# Patient Record
Sex: FEMALE | Race: WHITE | NOT HISPANIC OR LATINO | Employment: FULL TIME | URBAN - NONMETROPOLITAN AREA
[De-identification: names, ages, dates, MRNs, and addresses within clinical notes are randomized per-mention and may not be internally consistent; named-entity substitution may affect disease eponyms.]

---

## 2017-02-14 RX ORDER — NORGESTIMATE AND ETHINYL ESTRADIOL 7DAYSX3 28
1 KIT ORAL DAILY
Qty: 28 TABLET | Refills: 0 | Status: SHIPPED | OUTPATIENT
Start: 2017-02-14 | End: 2017-02-16 | Stop reason: SDUPTHER

## 2017-02-16 RX ORDER — NORGESTIMATE AND ETHINYL ESTRADIOL 7DAYSX3 28
1 KIT ORAL DAILY
Qty: 28 TABLET | Refills: 0 | Status: SHIPPED | OUTPATIENT
Start: 2017-02-16 | End: 2017-05-09

## 2017-03-02 ENCOUNTER — OFFICE VISIT (OUTPATIENT)
Dept: OBSTETRICS AND GYNECOLOGY | Facility: CLINIC | Age: 27
End: 2017-03-02

## 2017-03-02 VITALS
DIASTOLIC BLOOD PRESSURE: 80 MMHG | SYSTOLIC BLOOD PRESSURE: 140 MMHG | WEIGHT: 230 LBS | HEIGHT: 70 IN | BODY MASS INDEX: 32.93 KG/M2

## 2017-03-02 DIAGNOSIS — Z30.41 ENCOUNTER FOR SURVEILLANCE OF CONTRACEPTIVE PILLS: ICD-10-CM

## 2017-03-02 DIAGNOSIS — Z01.419 VISIT FOR GYNECOLOGIC EXAMINATION: Primary | ICD-10-CM

## 2017-03-02 DIAGNOSIS — B08.1 MOLLUSCUM CONTAGIOSUM: ICD-10-CM

## 2017-03-02 PROCEDURE — 99395 PREV VISIT EST AGE 18-39: CPT | Performed by: NURSE PRACTITIONER

## 2017-03-02 PROCEDURE — G0123 SCREEN CERV/VAG THIN LAYER: HCPCS | Performed by: NURSE PRACTITIONER

## 2017-03-02 RX ORDER — NORGESTIMATE AND ETHINYL ESTRADIOL 7DAYSX3 28
1 KIT ORAL DAILY
Qty: 28 TABLET | Refills: 12 | Status: SHIPPED | OUTPATIENT
Start: 2017-03-02 | End: 2017-03-02

## 2017-03-02 RX ORDER — NORGESTIMATE AND ETHINYL ESTRADIOL 7DAYSX3 28
1 KIT ORAL DAILY
Qty: 28 TABLET | Refills: 12 | Status: SHIPPED | OUTPATIENT
Start: 2017-03-02 | End: 2017-05-30

## 2017-03-02 NOTE — PATIENT INSTRUCTIONS
Molluscum Contagiosum, Adult  Molluscum contagiosum is a skin infection that can cause a rash. When molluscum contagiosum affects the genital area, it is called a sexually transmitted disease (STD).  CAUSES  Molluscum contagiosum is caused by a virus. The virus can spread easily from person to person through:  · Skin-to-skin contact with an infected person.  · Contact with an infected object, such as a towel or clothing.  RISK FACTORS  You may be at higher risk for molluscum contagiosum if you:  · Live in an area where the weather is moist and warm.  · Have a weak body defense system (immune system).  SIGNS AND SYMPTOMS  The main symptom is a rash that appears 2-7 weeks after exposure to the virus. It is made up of 2-20 small, firm, dome-shaped bumps that may:  · Be pink or flesh-colored.  · Appear alone or in groups.  · Range from the size of a pinhead to the size of a pencil eraser.  · Feel smooth and waxy.  · Have a pit in the middle.  · Itch. The rash does not itch for most people.  The bumps often appears on the genitals, thighs, face, neck, and abdomen.  DIAGNOSIS   A health care provider can usually diagnose molluscum contagiosum by looking at the bumps on your skin. To confirm the diagnosis, your health care provider may scrape the bumps to collect a skin sample to examine under a microscope.  TREATMENT  The bumps may go away on their own, but people often have treatment to keep the virus from infecting someone else or to keep the rash from spreading to other body parts. Treatment may include:  · Surgery to remove the bumps by freezing them (cryosurgery).  · A procedure to scrape off the bumps (curettage).  · A procedure to remove the bumps with a laser.  · Putting medicine on the bumps (topical treatment).  Sometimes no treatment is needed.   HOME CARE INSTRUCTIONS  · Take medicines only as directed by your health care provider.  · As long as you have bumps on your skin, the infection can spread to others  and to other parts of your body. To prevent this from happening:    Do not scratch the bumps.    Do not share clothing or towels with others until the bumps disappear.      Avoid close contact with others until the bumps disappear. This includes sexual contact.     Wash your hands often.    Cover the bumps with clothing or a bandage when you will be near other people.  SEEK MEDICAL CARE IF:  · The bumps are spreading.  · The bumps are becoming red and sore.  · The bumps have not gone away after 12 months.     This information is not intended to replace advice given to you by your health care provider. Make sure you discuss any questions you have with your health care provider.     Document Released: 07/15/2015 Document Reviewed: 07/15/2015  Elsevier Interactive Patient Education ©2016 [employer]

## 2017-03-06 LAB
GEN CATEG CVX/VAG CYTO-IMP: NORMAL
LAB AP CASE REPORT: NORMAL
LAB AP GYN OTHER FINDINGS: NORMAL
Lab: NORMAL
PATH INTERP SPEC-IMP: NORMAL
STAT OF ADQ CVX/VAG CYTO-IMP: NORMAL

## 2017-05-30 ENCOUNTER — OFFICE VISIT (OUTPATIENT)
Dept: NEUROLOGY | Facility: CLINIC | Age: 27
End: 2017-05-30

## 2017-05-30 VITALS
HEART RATE: 74 BPM | DIASTOLIC BLOOD PRESSURE: 80 MMHG | WEIGHT: 225 LBS | BODY MASS INDEX: 32.21 KG/M2 | HEIGHT: 70 IN | SYSTOLIC BLOOD PRESSURE: 132 MMHG

## 2017-05-30 DIAGNOSIS — M54.81 OCCIPITAL NEURALGIA OF RIGHT SIDE: ICD-10-CM

## 2017-05-30 DIAGNOSIS — F07.81 POST CONCUSSION SYNDROME: ICD-10-CM

## 2017-05-30 DIAGNOSIS — H57.02 ANISOCORIA: ICD-10-CM

## 2017-05-30 DIAGNOSIS — G43.719 INTRACTABLE CHRONIC MIGRAINE WITHOUT AURA AND WITHOUT STATUS MIGRAINOSUS: Primary | ICD-10-CM

## 2017-05-30 PROCEDURE — 99204 OFFICE O/P NEW MOD 45 MIN: CPT | Performed by: CLINICAL NURSE SPECIALIST

## 2017-05-30 PROCEDURE — 64405 NJX AA&/STRD GR OCPL NRV: CPT | Performed by: CLINICAL NURSE SPECIALIST

## 2017-05-30 RX ORDER — PROCHLORPERAZINE MALEATE 5 MG/1
5 TABLET ORAL EVERY 6 HOURS PRN
Qty: 12 TABLET | Refills: 0 | Status: SHIPPED | OUTPATIENT
Start: 2017-05-30 | End: 2018-02-09

## 2017-05-30 RX ORDER — LIDOCAINE HYDROCHLORIDE 10 MG/ML
13.1 INJECTION, SOLUTION INFILTRATION; PERINEURAL ONCE
Status: DISCONTINUED | OUTPATIENT
Start: 2017-05-30 | End: 2018-02-09

## 2017-05-30 RX ORDER — VERAPAMIL HYDROCHLORIDE 120 MG/1
120 CAPSULE, EXTENDED RELEASE ORAL NIGHTLY
Qty: 30 CAPSULE | Refills: 2 | Status: SHIPPED | OUTPATIENT
Start: 2017-05-30 | End: 2018-02-09

## 2017-05-30 RX ORDER — METHYLPREDNISOLONE ACETATE 40 MG/ML
12 INJECTION, SUSPENSION INTRA-ARTICULAR; INTRALESIONAL; INTRAMUSCULAR; SOFT TISSUE ONCE
Status: DISCONTINUED | OUTPATIENT
Start: 2017-05-30 | End: 2018-02-09

## 2017-06-05 ENCOUNTER — HOSPITAL ENCOUNTER (OUTPATIENT)
Dept: MRI IMAGING | Facility: HOSPITAL | Age: 27
Discharge: HOME | End: 2017-06-05
Admitting: CLINICAL NURSE SPECIALIST

## 2017-06-05 DIAGNOSIS — G43.719 INTRACTABLE CHRONIC MIGRAINE WITHOUT AURA AND WITHOUT STATUS MIGRAINOSUS: ICD-10-CM

## 2017-06-05 DIAGNOSIS — F07.81 POST CONCUSSION SYNDROME: ICD-10-CM

## 2017-06-05 DIAGNOSIS — M54.81 OCCIPITAL NEURALGIA OF RIGHT SIDE: ICD-10-CM

## 2017-06-05 PROCEDURE — 70551 MRI BRAIN STEM W/O DYE: CPT

## 2017-06-16 ENCOUNTER — TELEPHONE (OUTPATIENT)
Dept: NEUROLOGY | Facility: CLINIC | Age: 27
End: 2017-06-16

## 2017-08-25 ENCOUNTER — TRANSCRIBE ORDERS (OUTPATIENT)
Dept: ADMINISTRATIVE | Facility: HOSPITAL | Age: 27
End: 2017-08-25

## 2017-08-25 ENCOUNTER — HOSPITAL ENCOUNTER (OUTPATIENT)
Dept: GENERAL RADIOLOGY | Facility: HOSPITAL | Age: 27
Discharge: HOME | End: 2017-08-25
Admitting: NURSE PRACTITIONER

## 2017-08-25 DIAGNOSIS — M60.044: ICD-10-CM

## 2017-08-25 DIAGNOSIS — L08.9 UNSPECIFIED LOCAL INFECTION OF SKIN AND SUBCUTANEOUS TISSUE: Primary | ICD-10-CM

## 2017-08-25 DIAGNOSIS — L02.511 ABSCESS OF FINGER OF RIGHT HAND: ICD-10-CM

## 2017-08-25 PROCEDURE — 73140 X-RAY EXAM OF FINGER(S): CPT

## 2017-10-17 ENCOUNTER — TELEPHONE (OUTPATIENT)
Dept: NEUROLOGY | Facility: CLINIC | Age: 27
End: 2017-10-17

## 2017-10-17 NOTE — TELEPHONE ENCOUNTER
PT CALLED ON 10/12/17 SAID SHE HAS NEW INSURANCE AND WE NEED TO CHECK HER NERVE BLOCK BY HER NEW INSURANCE. SPOKE TO PT ATTEMPTING TO GET HER INSURANCE INFO ON THAT DAY, AND SHE DOENT HAVE IT. I CALLED HER BACK TODAY SHE SAID SHE DOESN'T HAVE THE NEW CARDS YET AND AS SOON AS SHE GETS THEM SHE WILL CALL US BACK WITH THAT INFORMATION.

## 2018-02-09 ENCOUNTER — OFFICE VISIT (OUTPATIENT)
Dept: NEUROLOGY | Facility: CLINIC | Age: 28
End: 2018-02-09

## 2018-02-09 VITALS
DIASTOLIC BLOOD PRESSURE: 84 MMHG | BODY MASS INDEX: 31.5 KG/M2 | HEIGHT: 70 IN | HEART RATE: 73 BPM | WEIGHT: 220 LBS | SYSTOLIC BLOOD PRESSURE: 138 MMHG | RESPIRATION RATE: 16 BRPM | OXYGEN SATURATION: 98 %

## 2018-02-09 DIAGNOSIS — G43.719 INTRACTABLE CHRONIC MIGRAINE WITHOUT AURA AND WITHOUT STATUS MIGRAINOSUS: Primary | ICD-10-CM

## 2018-02-09 DIAGNOSIS — H57.02 ANISOCORIA: ICD-10-CM

## 2018-02-09 DIAGNOSIS — M54.81 OCCIPITAL NEURALGIA OF RIGHT SIDE: ICD-10-CM

## 2018-02-09 DIAGNOSIS — F07.81 POST CONCUSSION SYNDROME: ICD-10-CM

## 2018-02-09 PROCEDURE — 99214 OFFICE O/P EST MOD 30 MIN: CPT | Performed by: CLINICAL NURSE SPECIALIST

## 2018-02-09 PROCEDURE — 64405 NJX AA&/STRD GR OCPL NRV: CPT | Performed by: CLINICAL NURSE SPECIALIST

## 2018-02-09 RX ORDER — CYCLOBENZAPRINE HCL 5 MG
TABLET ORAL
Refills: 2 | COMMUNITY
Start: 2017-12-20 | End: 2019-05-24

## 2018-02-09 RX ORDER — METHYLPREDNISOLONE ACETATE 40 MG/ML
24 INJECTION, SUSPENSION INTRA-ARTICULAR; INTRALESIONAL; INTRAMUSCULAR; SOFT TISSUE ONCE
Status: DISCONTINUED | OUTPATIENT
Start: 2018-02-09 | End: 2018-02-09

## 2018-02-09 RX ORDER — SULFAMETHOXAZOLE AND TRIMETHOPRIM 800; 160 MG/1; MG/1
TABLET ORAL
Refills: 0 | COMMUNITY
Start: 2018-02-07 | End: 2018-02-09

## 2018-02-09 RX ORDER — LIDOCAINE HYDROCHLORIDE 10 MG/ML
1.4 INJECTION, SOLUTION INFILTRATION; PERINEURAL ONCE
Status: DISCONTINUED | OUTPATIENT
Start: 2018-02-09 | End: 2021-02-19

## 2018-02-09 RX ORDER — METHYLPREDNISOLONE ACETATE 40 MG/ML
24 INJECTION, SUSPENSION INTRA-ARTICULAR; INTRALESIONAL; INTRAMUSCULAR; SOFT TISSUE ONCE
Status: DISCONTINUED | OUTPATIENT
Start: 2018-02-09 | End: 2021-02-19

## 2018-02-09 RX ORDER — LIDOCAINE HYDROCHLORIDE 10 MG/ML
1.4 INJECTION, SOLUTION INFILTRATION; PERINEURAL ONCE
Status: DISCONTINUED | OUTPATIENT
Start: 2018-02-09 | End: 2018-02-09

## 2018-02-09 NOTE — PROGRESS NOTES
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  She is here today for follow up for headaches and migraines. She was last seen 5/2017. 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 She was prescribed Verapamil  mg that was beneficial but she stopped taking when she completed school. She also at last visit received ONB on right. She states she has done well but has had return of occipital HA and neuralgia and now is bilateral. She is requesting ONB today. patient does have right facial numbness when HA more severe.   Patient did have MRI brain that was normal. She does chronically have pupil asymetry that was noted during school sport exam when she was younger and did have opthalmology exam and no cause was identified.      Neurologic Problem   The patient's pertinent negatives include no weakness. Associated symptoms include dizziness, headaches and nausea. Pertinent negatives include no confusion, fatigue or vomiting.   Headache    This is a chronic problem. Episode onset: 2015. The problem occurs daily (to 4-5 days per week, wakes with HA when sleeping during the day). The problem has been unchanged. The pain is located in the right unilateral region. Radiates to: start in neck and radiates to forehead. The pain quality is not similar to prior headaches. The quality of the pain is described as throbbing. Associated symptoms include blurred vision (pupils will enlarge), dizziness, nausea, numbness (on right side of face), phonophobia and photophobia. Pertinent negatives include no rhinorrhea, sore throat, vomiting or weakness. Associated symptoms comments: Started in 2015, and had concussion in April 2016 and HAs worsened after that. The symptoms are aggravated by weather changes and menstrual cycle (working night shift, drinks 1 diet pepsi a day, birth  control). Treatments tried: toradol, atenolol, maxalt, elavil, neurontin. ONB, verapamil. The treatment provided significant relief. There is no history of migraine headaches or migraines in the family.   Migraine    This is a chronic problem. The current episode started more than 1 year ago. Episode frequency: 3-4 / month. The problem has been unchanged. The pain is located in the right unilateral region. The pain quality is similar to prior headaches. The quality of the pain is described as throbbing. The pain is at a severity of 8/10. The pain is moderate. Associated symptoms include blurred vision (pupils will enlarge), dizziness, nausea, numbness (on right side of face), phonophobia and photophobia. Pertinent negatives include no rhinorrhea, sore throat, vomiting or weakness. The symptoms are aggravated by menstrual cycle (night shift, ). Treatments tried: toradol, atenolol, maxalt, elavil, neurontin. ONB<, verapmail. The treatment provided mild relief. There is no history of migraine headaches or migraines in the family.        Current Outpatient Prescriptions   Medication Sig Dispense Refill   • cyclobenzaprine (FLEXERIL) 5 MG tablet TK 1 T PO  TID PRF TENSION HEADACHE  2     Current Facility-Administered Medications   Medication Dose Route Frequency Provider Last Rate Last Dose   • lidocaine (XYLOCAINE) 1 % injection 13.1 mL  13.1 mL Subcutaneous Once Valorie Fine, APRN       • methylPREDNISolone acetate (DEPO-medrol) injection 12 mg  12 mg Intra-articular Once Valorie Fine, APRN           Past Medical History:   Diagnosis Date   • Depression    • Migraine    • Migraine        Past Surgical History:   Procedure Laterality Date   • ADENOIDECTOMY     • APPENDECTOMY     • TONSILLECTOMY     • WISDOM TOOTH EXTRACTION         family history includes Breast cancer in her paternal aunt; Cancer in her other; Colon cancer in her mother; Diabetes in her maternal grandmother; Hyperlipidemia in her other;  "Hypertension in her other.    Social History   Substance Use Topics   • Smoking status: Never Smoker   • Smokeless tobacco: Never Used   • Alcohol use No       Review of Systems   Constitutional: Negative.  Negative for fatigue.   HENT: Negative for nosebleeds, rhinorrhea and sore throat.    Eyes: Positive for blurred vision (pupils will enlarge) and photophobia.   Respiratory: Negative.    Cardiovascular: Negative.    Gastrointestinal: Positive for nausea. Negative for vomiting.   Endocrine: Negative.    Genitourinary: Negative.  Negative for dysuria and frequency.   Musculoskeletal: Negative for arthralgias, gait problem and myalgias.   Skin: Negative.    Allergic/Immunologic: Negative.    Neurological: Positive for dizziness, numbness (on right side of face) and headaches. Negative for speech difficulty and weakness.   Hematological: Negative.  Negative for adenopathy.   Psychiatric/Behavioral: Negative.  Negative for agitation, confusion and hallucinations.   All other systems reviewed and are negative.      Objective     /84 (BP Location: Left arm, Patient Position: Sitting, Cuff Size: Adult)  Pulse 73  Resp 16  Ht 177.8 cm (70\")  Wt 99.8 kg (220 lb)  SpO2 98%  BMI 31.57 kg/m2, Body mass index is 31.57 kg/(m^2).    Physical Exam   Constitutional: She is oriented to person, place, and time. She appears well-developed and well-nourished.   HENT:   Head: Normocephalic and atraumatic.   Right Ear: External ear normal.   Left Ear: External ear normal.   Nose: Nose normal.   Mouth/Throat: Oropharynx is clear and moist.   Right occipital tenderness to palpation   Eyes: Conjunctivae, EOM and lids are normal. Pupils are equal, round, and reactive to light.   Pupils assymetric, left slightly larger than right has chronic since at least age 18.   Neck: Neck supple. Carotid bruit is not present.   Cardiovascular: Normal rate, regular rhythm, S1 normal, S2 normal and normal heart sounds.    No murmur " heard.  Pulmonary/Chest: Effort normal and breath sounds normal.   Abdominal: Soft. Bowel sounds are normal.   Musculoskeletal: Normal range of motion.   Neurological: She is alert and oriented to person, place, and time. She has normal strength and normal reflexes. She displays no tremor. A cranial nerve deficit (chronic left pupil slightly larger than right) is present. No sensory deficit. She exhibits normal muscle tone. She displays a negative Romberg sign. Coordination and gait normal. GCS eye subscore is 4. GCS verbal subscore is 5. GCS motor subscore is 6.   Reflex Scores:       Tricep reflexes are 2+ on the right side and 2+ on the left side.       Bicep reflexes are 2+ on the right side and 2+ on the left side.       Brachioradialis reflexes are 2+ on the right side and 2+ on the left side.       Patellar reflexes are 2+ on the right side and 2+ on the left side.       Achilles reflexes are 2+ on the right side and 2+ on the left side.  Skin: Skin is warm and dry.   Psychiatric: She has a normal mood and affect. Her speech is normal and behavior is normal. Cognition and memory are normal.   Nursing note and vitals reviewed.      Results for orders placed or performed in visit on 03/02/17   Liquid-based Pap Smear, Screening   Result Value Ref Range    Case Report       Gynecologic Cytology Report                       Case: [id number]                                  Authorizing Provider:  Mary R Carrell, APRN       Collected:           03/02/2017 09:27 AM          Ordering Location:     [facility]     Received:            03/02/2017 11:54 AM                                 [facility]                                                                 Rescreen:              Lavonne Bah                                                              Pathologist:           Kathleen Martinez MD                                                           Specimen:    Liquid-Based Pap, Screening, Cervix,  Endocervix                                            Interpretation Negative for intraepithelial lesion or malignancy      General Categorization Within normal limits     Other Findings Cytolysis     Specimen Adequacy No endocervical cells - satisfactory     Embedded Images          ASSESSMENT/PLAN    Diagnoses and all orders for this visit:    Intractable chronic migraine without aura and without status migrainosus    Occipital neuralgia of right side    Post concussion syndrome    Anisocoria (left pupil chronically larger since age 18)    Other orders  -     cyclobenzaprine (FLEXERIL) 5 MG tablet; TK 1 T PO  TID PRF TENSION HEADACHE  -     Discontinue: sulfamethoxazole-trimethoprim (BACTRIM DS,SEPTRA DS) 800-160 MG per tablet; TK 1 T PO Q 12 H FOR 7 DAYS    MEDICAL DECISION MAKIN. Obtain consent and perform bilateral occipital nerve block today.  2. If KUMAR return she is to notify the office and will resume verapamil   3. Avoid triptans for complicated migraine. Trial compazine 5 mg as abortive agent.  5. Elevated BMI - Patient's BMI is above normal parameters. Follow-up plan includes:  no follow-up required.      Patient given printed education with AVS for diet/exerise with AVS and encouraged to include 30 minutes of exercise 3-4 times weekly.             allergies and all known medications/prescriptions have been reviewed using resources available on this encounter.    Return if symptoms worsen or fail to improve.        Valorie Fine, APRN

## 2018-02-09 NOTE — PROGRESS NOTES
Procedure   Procedures   Nerve Block Procedure: Bilateral Occipital Nerve Block    Pre-operative diagnosis: bilateral occipital neuralgia   Post-operative diagnosis: bilateral occipital neuralgia       Procedure: lidocaine 1% and Depo-medrol  nerve injection.     After risks and benefits were explained including bleeding, infection, worsening of the pain, damage to the area being injected, vascular injection, allergy to medications and nerve damage, a signed consent was obtained.  All questions were answered.      The nerve(s) were identified and the skin prepped 3 times with alcohol and the alcohol allowed to dry.  Next, 27  gauge 0.5 inch needle was taken and placed in the nerve area.  Next, after negative aspiration was confirmed, the nerve area was slowly injected and the needle removed.      The patient did tolerate the procedure well.  All aspirations were negative, there was not resistance to injection, there was not paresthesias, and there were not complications.      Total medication used: 24 mg Kenalog; 1.4 ml 1% Lidocaine

## 2019-05-24 ENCOUNTER — OFFICE VISIT (OUTPATIENT)
Dept: OBSTETRICS AND GYNECOLOGY | Facility: CLINIC | Age: 29
End: 2019-05-24

## 2019-05-24 VITALS
SYSTOLIC BLOOD PRESSURE: 156 MMHG | DIASTOLIC BLOOD PRESSURE: 94 MMHG | BODY MASS INDEX: 31.7 KG/M2 | HEIGHT: 69 IN | WEIGHT: 214 LBS

## 2019-05-24 DIAGNOSIS — Z11.3 SCREENING EXAMINATION FOR STD (SEXUALLY TRANSMITTED DISEASE): ICD-10-CM

## 2019-05-24 DIAGNOSIS — E55.9 VITAMIN D DEFICIENCY: ICD-10-CM

## 2019-05-24 DIAGNOSIS — Z80.3 FAMILY HISTORY OF BREAST CANCER: ICD-10-CM

## 2019-05-24 DIAGNOSIS — Z80.0 FAMILY HISTORY OF COLON CANCER: ICD-10-CM

## 2019-05-24 DIAGNOSIS — Z01.419 WELL WOMAN EXAM WITH ROUTINE GYNECOLOGICAL EXAM: Primary | ICD-10-CM

## 2019-05-24 DIAGNOSIS — Z31.69 PRE-CONCEPTION COUNSELING: ICD-10-CM

## 2019-05-24 DIAGNOSIS — N93.8 DUB (DYSFUNCTIONAL UTERINE BLEEDING): ICD-10-CM

## 2019-05-24 PROCEDURE — G0123 SCREEN CERV/VAG THIN LAYER: HCPCS | Performed by: NURSE PRACTITIONER

## 2019-05-24 PROCEDURE — 99395 PREV VISIT EST AGE 18-39: CPT | Performed by: NURSE PRACTITIONER

## 2019-05-24 PROCEDURE — 87591 N.GONORRHOEAE DNA AMP PROB: CPT | Performed by: NURSE PRACTITIONER

## 2019-05-24 PROCEDURE — 87491 CHLMYD TRACH DNA AMP PROBE: CPT | Performed by: NURSE PRACTITIONER

## 2019-05-24 RX ORDER — PRENATAL VIT NO.126/IRON/FOLIC 28MG-0.8MG
TABLET ORAL DAILY
COMMUNITY

## 2019-05-24 RX ORDER — ERGOCALCIFEROL 1.25 MG/1
50000 CAPSULE ORAL WEEKLY
COMMUNITY
End: 2020-09-02

## 2019-05-24 RX ORDER — ESCITALOPRAM OXALATE 5 MG/1
5 TABLET ORAL DAILY
COMMUNITY
End: 2020-09-02

## 2019-05-24 NOTE — PATIENT INSTRUCTIONS

## 2019-05-24 NOTE — PROGRESS NOTES
"Mary Caruso is a 28 y.o. female    Patient is here today for annual checkup.  She has been trying to get pregnant for over a year.  She states that her periods will be normal for several months and then she will have a period every 2 weeks.  She feels that she is not ovulating.  She has had some lab work at her place of employment and it is essentially negative.      Gynecologic Exam   The patient's pertinent negatives include no pelvic pain, vaginal bleeding or vaginal discharge. The patient is experiencing no pain. Pertinent negatives include no abdominal pain, anorexia, back pain, chills, constipation, diarrhea, discolored urine, dysuria, fever, flank pain, frequency, headaches, hematuria, joint pain, joint swelling, nausea, painful intercourse, rash, sore throat, urgency or vomiting. She is sexually active. She uses nothing for contraception. Her menstrual history has been regular.         /94   Ht 175.3 cm (69\")   Wt 97.1 kg (214 lb)   LMP 05/05/2019 (Approximate)   Breastfeeding? No   BMI 31.60 kg/m²     Outpatient Encounter Medications as of 5/24/2019   Medication Sig Dispense Refill   • escitalopram (LEXAPRO) 5 MG tablet Take 5 mg by mouth Daily.     • Prenatal Vit-Fe Fumarate-FA (PRENATAL, CLASSIC, VITAMIN) 28-0.8 MG tablet tablet Take  by mouth Daily.     • vitamin D (ERGOCALCIFEROL) 16992 units capsule capsule Take 50,000 Units by mouth 1 (One) Time Per Week.     • [DISCONTINUED] cyclobenzaprine (FLEXERIL) 5 MG tablet TK 1 T PO  TID PRF TENSION HEADACHE  2     Facility-Administered Encounter Medications as of 5/24/2019   Medication Dose Route Frequency Provider Last Rate Last Dose   • lidocaine (XYLOCAINE) 1 % injection 1.4 mL  1.4 mL Subcutaneous Once Valorie Fine, APRN       • methylPREDNISolone acetate (DEPO-medrol) injection 24 mg  24 mg Intra-articular Once Valorie Fine, APRN           Surgical History  Past Surgical History:   Procedure Laterality Date   • " ADENOIDECTOMY     • APPENDECTOMY     • TONSILLECTOMY     • WISDOM TOOTH EXTRACTION         Family History  Family History   Problem Relation Age of Onset   • Colon cancer Mother 45   • Breast cancer Paternal Aunt 42   • Diabetes Maternal Grandmother    • Hyperlipidemia Other    • Hypertension Other    • Cancer Other    • Heart disease Father    • Colon cancer Paternal Uncle 60   • Esophageal cancer Paternal Uncle 42   • Ovarian cancer Neg Hx    • Uterine cancer Neg Hx    • Melanoma Neg Hx    • Prostate cancer Neg Hx        The following portions of the patient's history were reviewed and updated as appropriate: allergies, current medications, past family history, past medical history, past social history, past surgical history and problem list.    Review of Systems   Constitutional: Negative for activity change, appetite change, chills, diaphoresis, fatigue, fever, unexpected weight gain and unexpected weight loss.   HENT: Negative for congestion, dental problem, drooling, ear discharge, ear pain, facial swelling, hearing loss, mouth sores, nosebleeds, postnasal drip, rhinorrhea, sinus pressure, sneezing, sore throat, tinnitus, trouble swallowing and voice change.    Eyes: Negative for blurred vision, double vision, photophobia, pain, discharge, redness, itching and visual disturbance.   Respiratory: Negative for apnea, cough, choking, chest tightness, shortness of breath, wheezing and stridor.    Cardiovascular: Negative for chest pain, palpitations and leg swelling.   Gastrointestinal: Negative for abdominal distention, abdominal pain, anal bleeding, anorexia, blood in stool, constipation, diarrhea, nausea, rectal pain, vomiting, GERD and indigestion.   Endocrine: Negative for cold intolerance, heat intolerance, polydipsia, polyphagia and polyuria.   Genitourinary: Positive for menstrual problem. Negative for breast discharge, urinary incontinence, decreased libido, decreased urine volume, difficulty urinating,  dyspareunia, dysuria, flank pain, frequency, genital sores, hematuria, pelvic pain, urgency, vaginal bleeding, vaginal discharge, vaginal pain, breast lump and breast pain.   Musculoskeletal: Negative for arthralgias, back pain, gait problem, joint pain, joint swelling, myalgias, neck pain, neck stiffness and bursitis.   Skin: Negative for color change, dry skin and rash.   Allergic/Immunologic: Negative for environmental allergies, food allergies and immunocompromised state.   Neurological: Negative for dizziness, tremors, seizures, syncope, facial asymmetry, speech difficulty, weakness, light-headedness, numbness, headache, memory problem and confusion.   Hematological: Negative for adenopathy. Does not bruise/bleed easily.   Psychiatric/Behavioral: Negative for agitation, behavioral problems, decreased concentration, dysphoric mood, hallucinations, self-injury, sleep disturbance, suicidal ideas, negative for hyperactivity, depressed mood and stress. The patient is not nervous/anxious.        Objective   Physical Exam   Constitutional: She is oriented to person, place, and time. She appears well-developed and well-nourished. No distress.   HENT:   Head: Normocephalic.   Right Ear: External ear normal.   Left Ear: External ear normal.   Nose: Nose normal.   Mouth/Throat: Oropharynx is clear and moist.   Eyes: Conjunctivae are normal. Right eye exhibits no discharge. Left eye exhibits no discharge. No scleral icterus.   Neck: Normal range of motion. Neck supple. Carotid bruit is not present. No tracheal deviation present. No thyromegaly present.   Cardiovascular: Normal rate, regular rhythm, normal heart sounds and intact distal pulses.   No murmur heard.  Pulmonary/Chest: Effort normal and breath sounds normal. No respiratory distress. She has no wheezes. Right breast exhibits no inverted nipple, no mass, no nipple discharge, no skin change and no tenderness. Left breast exhibits no inverted nipple, no mass, no  nipple discharge, no skin change and no tenderness. Breasts are symmetrical. There is no breast swelling.   Abdominal: Soft. She exhibits no distension and no mass. There is no tenderness. There is no guarding. No hernia. Hernia confirmed negative in the right inguinal area and confirmed negative in the left inguinal area.   Genitourinary: Rectum normal, vagina normal and uterus normal. Rectal exam shows no mass. No breast tenderness, discharge or bleeding. Pelvic exam was performed with patient supine. There is no rash, tenderness, lesion or injury on the right labia. There is no rash, tenderness, lesion or injury on the left labia. Uterus is not enlarged, not fixed and not tender. Cervix exhibits no motion tenderness, no discharge and no friability. Right adnexum displays no mass, no tenderness and no fullness. Left adnexum displays no mass, no tenderness and no fullness. No erythema, tenderness or bleeding in the vagina. No foreign body in the vagina. No signs of injury around the vagina. No vaginal discharge found.   Genitourinary Comments:   BSU normal  Urethral meatus  Normal  Perineum  Normal   Musculoskeletal: Normal range of motion. She exhibits no edema or tenderness.   Lymphadenopathy:        Head (right side): No submental, no submandibular, no tonsillar, no preauricular, no posterior auricular and no occipital adenopathy present.        Head (left side): No submental, no submandibular, no tonsillar, no preauricular, no posterior auricular and no occipital adenopathy present.     She has no cervical adenopathy.        Right cervical: No superficial cervical, no deep cervical and no posterior cervical adenopathy present.       Left cervical: No superficial cervical, no deep cervical and no posterior cervical adenopathy present.     She has no axillary adenopathy.        Right: No inguinal adenopathy present.        Left: No inguinal adenopathy present.   Neurological: She is alert and oriented to person,  place, and time. Coordination normal.   Skin: Skin is warm and dry. No bruising and no rash noted. She is not diaphoretic. No erythema.   Psychiatric: She has a normal mood and affect. Her behavior is normal. Judgment and thought content normal.   Nursing note and vitals reviewed.      Assessment/Plan   Sofia was seen today for gynecologic exam.    Diagnoses and all orders for this visit:    Well woman exam with routine gynecological exam  Normal GYN exam. Will have lab work at her place of employment. Encouraged SBE, pt is aware how to do self breast exam and the importance of same. Discussed weight management and importance of maintaining a healthy weight. Discussed Vitamin D intake and the importance of adequate vitamin D for both Bone Health and a healthy immune system.  Discussed Daily exercise and the importance of same, in regards to a healthy heart as well as helping to maintain her weight and improving her mental health.  BMI 31.6.  Pap smear is done per ASCCP guidelines.  GC and Chlamydia were added to Pap smear.  -     Liquid-based Pap Smear, Screening    Pre-conception counseling  Folic acid for the prevention of neural tube defects was discussed.  At least 0.4 mg should be taken preconceptionally to reduce the risk.  It was explained that this may reduce the baseline incidence of neural tube defect by as much as 65%.  Folic acid can be found in OTC multivitamins, OTC prenatal vitamins or breakfast cereal that that contains 100% of the RDA of folate. She is encouraged to stop drinking alcohol and to stop smoking if she smokes. Encouraged to eat a healthy diet.     Family history of breast cancer  Comments:  Patient has family history of breast cancer.  She has had genetic screening and is negative.    Family history of colon cancer  Comments:  Patient has family history of colon cancer. she has had genetic screening done and is negative.    Vitamin D deficiency  Comments:  Patient is on vitamin D we will  check recheck that level.  Orders:  -     Vitamin D 25 Hydroxy    DUB (dysfunctional uterine bleeding)  Comments:  Patient will have irregular periods at times and then they will be 28-day cycles.  she is not sure she is ovulating.  Has had some lab work at her place of employment, but did not have all of her PCOS type labs drawn.  She is given an order today and will do that at her place of employment and will send me those copies.  she will also return here for pelvic ultrasound for the   DUB.  Orders:  -     Comprehensive Metabolic Panel  -     Insulin, Total  -     Follicle Stimulating Hormone  -     Luteinizing Hormone    STD screening  GC and chlamydia is added to Pap smear  -     Liquid-based Pap Smear, Screening    Patient's Body mass index is 31.6 kg/m². BMI is above normal parameters. Recommendations include: educational material, exercise counseling and nutrition counseling.      Carolina Carreon, APRN  5/24/2019

## 2019-05-28 LAB
GEN CATEG CVX/VAG CYTO-IMP: NORMAL
LAB AP CASE REPORT: NORMAL
LAB AP GYN ADDITIONAL INFORMATION: NORMAL
LAB AP GYN OTHER FINDINGS: NORMAL
PATH INTERP SPEC-IMP: NORMAL
STAT OF ADQ CVX/VAG CYTO-IMP: NORMAL

## 2019-05-29 LAB
C TRACH RRNA CVX QL NAA+PROBE: NOT DETECTED
N GONORRHOEA RRNA SPEC QL NAA+PROBE: NOT DETECTED

## 2019-08-16 ENCOUNTER — PROCEDURE VISIT (OUTPATIENT)
Dept: OBSTETRICS AND GYNECOLOGY | Facility: CLINIC | Age: 29
End: 2019-08-16

## 2019-08-16 ENCOUNTER — OFFICE VISIT (OUTPATIENT)
Dept: OBSTETRICS AND GYNECOLOGY | Facility: CLINIC | Age: 29
End: 2019-08-16

## 2019-08-16 VITALS
DIASTOLIC BLOOD PRESSURE: 100 MMHG | WEIGHT: 207 LBS | SYSTOLIC BLOOD PRESSURE: 152 MMHG | BODY MASS INDEX: 30.66 KG/M2 | HEIGHT: 69 IN

## 2019-08-16 DIAGNOSIS — N93.8 DUB (DYSFUNCTIONAL UTERINE BLEEDING): Primary | ICD-10-CM

## 2019-08-16 PROCEDURE — 99213 OFFICE O/P EST LOW 20 MIN: CPT | Performed by: NURSE PRACTITIONER

## 2019-08-16 PROCEDURE — 76830 TRANSVAGINAL US NON-OB: CPT | Performed by: OBSTETRICS & GYNECOLOGY

## 2019-08-16 NOTE — PROGRESS NOTES
"Subjective     Sofia Caruso is a 28 y.o. female    Patient comes in today for complaint of irregular bleeding for over the past 6 months.  States she will have a normal period and then will have another period Midcycle.  She is trying to get pregnant and feels that she is not ovulating. She has had PCOS labs done at her place of employment they were all normal.       Menstrual Problem   This is a recurrent problem. The current episode started more than 1 month ago. The problem occurs intermittently. The problem has been gradually worsening. Pertinent negatives include no abdominal pain, anorexia, arthralgias, change in bowel habit, chest pain, chills, congestion, coughing, diaphoresis, fatigue, fever, headaches, joint swelling, myalgias, nausea, neck pain, numbness, rash, sore throat, swollen glands, urinary symptoms, vertigo, visual change, vomiting or weakness. Nothing aggravates the symptoms. She has tried nothing for the symptoms.         /100   Ht 175.3 cm (69\")   Wt 93.9 kg (207 lb)   LMP 07/21/2019 (Approximate)   Breastfeeding? No   BMI 30.57 kg/m²     Outpatient Encounter Medications as of 8/16/2019   Medication Sig Dispense Refill   • escitalopram (LEXAPRO) 5 MG tablet Take 5 mg by mouth Daily.     • Prenatal Vit-Fe Fumarate-FA (PRENATAL, CLASSIC, VITAMIN) 28-0.8 MG tablet tablet Take  by mouth Daily.     • vitamin D (ERGOCALCIFEROL) 45425 units capsule capsule Take 50,000 Units by mouth 1 (One) Time Per Week.       Facility-Administered Encounter Medications as of 8/16/2019   Medication Dose Route Frequency Provider Last Rate Last Dose   • lidocaine (XYLOCAINE) 1 % injection 1.4 mL  1.4 mL Subcutaneous Once Valorie Fine, APRN       • methylPREDNISolone acetate (DEPO-medrol) injection 24 mg  24 mg Intra-articular Once Valorie Fine, APRN           Surgical History  Past Surgical History:   Procedure Laterality Date   • ADENOIDECTOMY     • APPENDECTOMY     • TONSILLECTOMY     • WISDOM " TOOTH EXTRACTION         Family History  Family History   Problem Relation Age of Onset   • Colon cancer Mother 45   • Breast cancer Paternal Aunt 42   • Diabetes Maternal Grandmother    • Hyperlipidemia Other    • Hypertension Other    • Cancer Other    • Heart disease Father    • Colon cancer Paternal Uncle 60   • Esophageal cancer Paternal Uncle 42   • Ovarian cancer Neg Hx    • Uterine cancer Neg Hx    • Melanoma Neg Hx    • Prostate cancer Neg Hx        The following portions of the patient's history were reviewed and updated as appropriate: allergies, current medications, past family history, past medical history, past social history, past surgical history and problem list.    Review of Systems   Constitutional: Negative for activity change, appetite change, chills, diaphoresis, fatigue, fever, unexpected weight gain and unexpected weight loss.   HENT: Negative for congestion, dental problem, drooling, ear discharge, ear pain, facial swelling, hearing loss, mouth sores, nosebleeds, postnasal drip, rhinorrhea, sinus pressure, sneezing, sore throat, swollen glands, tinnitus, trouble swallowing and voice change.    Eyes: Negative for blurred vision, double vision, photophobia, pain, discharge, redness, itching and visual disturbance.   Respiratory: Negative for apnea, cough, choking, chest tightness, shortness of breath, wheezing and stridor.    Cardiovascular: Negative for chest pain, palpitations and leg swelling.   Gastrointestinal: Negative for abdominal distention, abdominal pain, anal bleeding, anorexia, blood in stool, change in bowel habit, constipation, diarrhea, nausea, rectal pain, vomiting, GERD and indigestion.   Endocrine: Negative for cold intolerance, heat intolerance, polydipsia, polyphagia and polyuria.   Genitourinary: Positive for menstrual problem. Negative for amenorrhea, breast discharge, breast lump, breast pain, decreased libido, decreased urine volume, difficulty urinating, dyspareunia,  dysuria, flank pain, frequency, genital sores, hematuria, pelvic pain, pelvic pressure, urgency, urinary incontinence, vaginal bleeding, vaginal discharge and vaginal pain.   Musculoskeletal: Negative for arthralgias, back pain, gait problem, joint swelling, myalgias, neck pain, neck stiffness and bursitis.   Skin: Negative for color change, dry skin and rash.   Allergic/Immunologic: Negative for environmental allergies, food allergies and immunocompromised state.   Neurological: Negative for dizziness, vertigo, tremors, seizures, syncope, facial asymmetry, speech difficulty, weakness, light-headedness, numbness, headache, memory problem and confusion.   Hematological: Negative for adenopathy. Does not bruise/bleed easily.   Psychiatric/Behavioral: Negative for agitation, behavioral problems, decreased concentration, dysphoric mood, hallucinations, self-injury, sleep disturbance, suicidal ideas, negative for hyperactivity, depressed mood and stress. The patient is not nervous/anxious.        Objective   Physical Exam   Constitutional: She is oriented to person, place, and time. She appears well-developed and well-nourished.   HENT:   Head: Normocephalic and atraumatic.   Eyes: Conjunctivae are normal. Right eye exhibits no discharge. Left eye exhibits no discharge.   Neck: Normal range of motion. Neck supple. No thyromegaly present.   Cardiovascular: Normal rate, regular rhythm and normal heart sounds.   Pulmonary/Chest: Effort normal and breath sounds normal.   Neurological: She is alert and oriented to person, place, and time.   Skin: Skin is warm and dry.   Psychiatric: She has a normal mood and affect. Her behavior is normal. Judgment and thought content normal.   Nursing note and vitals reviewed.      Assessment/Plan   Sofia was seen today for vaginal bleeding.    Diagnoses and all orders for this visit:    DUB (dysfunctional uterine bleeding)  Comments:  Patient has been having irregular bleeding for at least  6 months.  She does not feel that she is ovulating and is trying to get pregnant.  Ultrasound today shows a very thin endometrium and a 3 cm simple cyst on the left.  She has had PCOS labs at her place of employment they were all normal other than the FSH and LH were low.  She will get a serum prolactin and progesterone today.  If normal we may try progesterone to see if we can regulate her cycles.  Discussed trying birth control pills for 2 to 3 months and then coming off them she does not really want to do that.  Could also do Clomid or Femara but with a small cyst on her ovary explained we will need to try to make sure that is gone.  Orders:  -     Progesterone  -     Prolactin         Patient's Body mass index is 30.57 kg/m². BMI is above normal parameters. Recommendations include: educational material, exercise counseling and nutrition counseling.      Carolina Carreon, CHELSIE  8/16/2019

## 2019-08-16 NOTE — PATIENT INSTRUCTIONS

## 2019-08-17 LAB
PROGEST SERPL-MCNC: <0.1 NG/ML
PROLACTIN SERPL-MCNC: 13.1 NG/ML (ref 4.8–23.3)

## 2019-08-19 ENCOUNTER — TELEPHONE (OUTPATIENT)
Dept: OBSTETRICS AND GYNECOLOGY | Facility: CLINIC | Age: 29
End: 2019-08-19

## 2019-08-19 NOTE — TELEPHONE ENCOUNTER
----- Message from CHELSIE Ruvalcaba sent at 8/19/2019  1:09 PM CDT -----  Your progesterone is low. I want you to try Progesterone day 16-25 of your cycle.  I will send that into your pharmacy.  That may help you get rid of the ovarian cyst as well as increase the progesterone level.  After we repeat the ultrasound if your cyst is gone we can try the Clomid or femara that we talked about.  TRC

## 2019-11-21 ENCOUNTER — OFFICE VISIT (OUTPATIENT)
Dept: OBGYN | Age: 29
End: 2019-11-21
Payer: COMMERCIAL

## 2019-11-21 VITALS
BODY MASS INDEX: 29.78 KG/M2 | HEIGHT: 70 IN | WEIGHT: 208 LBS | HEART RATE: 101 BPM | DIASTOLIC BLOOD PRESSURE: 94 MMHG | SYSTOLIC BLOOD PRESSURE: 143 MMHG

## 2019-11-21 DIAGNOSIS — Z31.69 PRE-CONCEPTION COUNSELING: ICD-10-CM

## 2019-11-21 DIAGNOSIS — N92.6 IRREGULAR MENSES: ICD-10-CM

## 2019-11-21 DIAGNOSIS — N83.202 CYST OF LEFT OVARY: ICD-10-CM

## 2019-11-21 DIAGNOSIS — Z76.89 ENCOUNTER TO ESTABLISH CARE WITH NEW DOCTOR: Primary | ICD-10-CM

## 2019-11-21 PROCEDURE — 99204 OFFICE O/P NEW MOD 45 MIN: CPT | Performed by: ADVANCED PRACTICE MIDWIFE

## 2019-11-21 RX ORDER — ESCITALOPRAM OXALATE 5 MG/1
5 TABLET ORAL
COMMUNITY

## 2019-11-21 RX ORDER — ERGOCALCIFEROL 1.25 MG/1
CAPSULE ORAL
COMMUNITY
Start: 2019-10-30

## 2019-11-21 RX ORDER — METFORMIN HYDROCHLORIDE 500 MG/1
500 TABLET, EXTENDED RELEASE ORAL
Qty: 90 TABLET | Refills: 1 | Status: SHIPPED | OUTPATIENT
Start: 2019-11-21

## 2019-11-21 RX ORDER — PRENATAL VIT NO.126/IRON/FOLIC 28MG-0.8MG
TABLET ORAL
COMMUNITY

## 2019-11-21 RX ORDER — MEDROXYPROGESTERONE ACETATE 10 MG/1
10 TABLET ORAL DAILY
Qty: 10 TABLET | Refills: 0 | Status: SHIPPED | OUTPATIENT
Start: 2019-11-21

## 2019-11-22 ASSESSMENT — ENCOUNTER SYMPTOMS
ALLERGIC/IMMUNOLOGIC NEGATIVE: 1
GASTROINTESTINAL NEGATIVE: 1
EYES NEGATIVE: 1
RESPIRATORY NEGATIVE: 1

## 2019-12-04 ENCOUNTER — TELEPHONE (OUTPATIENT)
Dept: OBGYN | Age: 29
End: 2019-12-04

## 2020-01-03 ENCOUNTER — TELEPHONE (OUTPATIENT)
Dept: OBGYN | Age: 30
End: 2020-01-03

## 2020-09-02 ENCOUNTER — INITIAL PRENATAL (OUTPATIENT)
Dept: OBSTETRICS AND GYNECOLOGY | Facility: CLINIC | Age: 30
End: 2020-09-02

## 2020-09-02 VITALS — SYSTOLIC BLOOD PRESSURE: 126 MMHG | WEIGHT: 215 LBS | BODY MASS INDEX: 31.75 KG/M2 | DIASTOLIC BLOOD PRESSURE: 80 MMHG

## 2020-09-02 DIAGNOSIS — Z78.9 NON-SMOKER: ICD-10-CM

## 2020-09-02 DIAGNOSIS — Z34.91 PRENATAL CARE IN FIRST TRIMESTER: Primary | ICD-10-CM

## 2020-09-02 PROCEDURE — 0501F PRENATAL FLOW SHEET: CPT | Performed by: OBSTETRICS & GYNECOLOGY

## 2020-09-02 RX ORDER — ESTRADIOL 2 MG/1
TABLET ORAL
COMMUNITY
Start: 2020-08-24 | End: 2020-11-04

## 2020-09-02 RX ORDER — PROGESTERONE 50 MG/ML
INJECTION, SOLUTION INTRAMUSCULAR
COMMUNITY
Start: 2020-08-25 | End: 2020-11-04

## 2020-09-02 RX ORDER — PRENATAL VIT/IRON FUM/FOLIC AC 27MG-0.8MG
TABLET ORAL
COMMUNITY
End: 2020-09-02 | Stop reason: SDUPTHER

## 2020-09-02 RX ORDER — LANOLIN ALCOHOL/MO/W.PET/CERES
400 CREAM (GRAM) TOPICAL DAILY
COMMUNITY
End: 2021-01-15

## 2020-09-02 RX ORDER — ESTRADIOL 0.1 MG/D
FILM, EXTENDED RELEASE TRANSDERMAL
COMMUNITY
Start: 2020-08-18 | End: 2020-11-04

## 2020-09-02 NOTE — PROGRESS NOTES
Vaginal bleeding warnings were given.  Pelvic pain warnings were given.  Nausea and vomiting warnings reviewed.  Patient was instructed to call the office for any concerns or problems.    Abd soft and NT  Ext NT and NE    Ultrasound for next appt not indicated    Panorama little benefit given normal PGD  Wants carrier testing.

## 2020-09-07 LAB
ABO GROUP BLD: NORMAL
BACTERIA UR CULT: NORMAL
BACTERIA UR CULT: NORMAL
BASOPHILS # BLD AUTO: 0.02 10*3/MM3 (ref 0–0.2)
BASOPHILS NFR BLD AUTO: 0.3 % (ref 0–1.5)
BLD GP AB SCN SERPL QL: NEGATIVE
C TRACH RRNA SPEC QL NAA+PROBE: NEGATIVE
DRUGS UR: NORMAL
EOSINOPHIL # BLD AUTO: 0.2 10*3/MM3 (ref 0–0.4)
EOSINOPHIL NFR BLD AUTO: 2.6 % (ref 0.3–6.2)
ERYTHROCYTE [DISTWIDTH] IN BLOOD BY AUTOMATED COUNT: 12.2 % (ref 12.3–15.4)
HBV SURFACE AG SERPL QL IA: NEGATIVE
HCT VFR BLD AUTO: 40.2 % (ref 34–46.6)
HGB BLD-MCNC: 12.8 G/DL (ref 12–15.9)
HIV 1+2 AB+HIV1 P24 AG SERPL QL IA: NON REACTIVE
IMM GRANULOCYTES # BLD AUTO: 0.02 10*3/MM3 (ref 0–0.05)
IMM GRANULOCYTES NFR BLD AUTO: 0.3 % (ref 0–0.5)
LYMPHOCYTES # BLD AUTO: 2.14 10*3/MM3 (ref 0.7–3.1)
LYMPHOCYTES NFR BLD AUTO: 28.2 % (ref 19.6–45.3)
MCH RBC QN AUTO: 28.1 PG (ref 26.6–33)
MCHC RBC AUTO-ENTMCNC: 31.8 G/DL (ref 31.5–35.7)
MCV RBC AUTO: 88.4 FL (ref 79–97)
MONOCYTES # BLD AUTO: 0.53 10*3/MM3 (ref 0.1–0.9)
MONOCYTES NFR BLD AUTO: 7 % (ref 5–12)
N GONORRHOEA RRNA SPEC QL NAA+PROBE: NEGATIVE
NEUTROPHILS # BLD AUTO: 4.68 10*3/MM3 (ref 1.7–7)
NEUTROPHILS NFR BLD AUTO: 61.6 % (ref 42.7–76)
NRBC BLD AUTO-RTO: 0 /100 WBC (ref 0–0.2)
PLATELET # BLD AUTO: 398 10*3/MM3 (ref 140–450)
RBC # BLD AUTO: 4.55 10*6/MM3 (ref 3.77–5.28)
RH BLD: POSITIVE
RPR SER QL: NON REACTIVE
RUBV IGG SERPL IA-ACNC: 1.75 INDEX
WBC # BLD AUTO: 7.59 10*3/MM3 (ref 3.4–10.8)

## 2020-10-02 ENCOUNTER — ROUTINE PRENATAL (OUTPATIENT)
Dept: OBSTETRICS AND GYNECOLOGY | Facility: CLINIC | Age: 30
End: 2020-10-02

## 2020-10-02 VITALS — DIASTOLIC BLOOD PRESSURE: 80 MMHG | WEIGHT: 217 LBS | SYSTOLIC BLOOD PRESSURE: 134 MMHG | BODY MASS INDEX: 32.05 KG/M2

## 2020-10-02 DIAGNOSIS — Z78.9 NON-SMOKER: ICD-10-CM

## 2020-10-02 DIAGNOSIS — Z34.92 PRENATAL CARE IN SECOND TRIMESTER: Primary | ICD-10-CM

## 2020-10-02 LAB
GLUCOSE UR STRIP-MCNC: NEGATIVE MG/DL
PROT UR STRIP-MCNC: NEGATIVE MG/DL

## 2020-10-02 PROCEDURE — 0502F SUBSEQUENT PRENATAL CARE: CPT | Performed by: OBSTETRICS & GYNECOLOGY

## 2020-10-02 NOTE — PROGRESS NOTES
Vaginal bleeding warnings were given.  Pelvic pain warnings were given.  Nausea and vomiting warnings reviewed.  Patient was instructed to call the office for any concerns or problems.    Abd soft and NT  Ext NT and NE    Ultrasound for next appt skip vanegas

## 2020-11-04 ENCOUNTER — ROUTINE PRENATAL (OUTPATIENT)
Dept: OBSTETRICS AND GYNECOLOGY | Facility: CLINIC | Age: 30
End: 2020-11-04

## 2020-11-04 VITALS — SYSTOLIC BLOOD PRESSURE: 136 MMHG | BODY MASS INDEX: 32.19 KG/M2 | DIASTOLIC BLOOD PRESSURE: 84 MMHG | WEIGHT: 218 LBS

## 2020-11-04 DIAGNOSIS — F41.8 DEPRESSION WITH ANXIETY: ICD-10-CM

## 2020-11-04 DIAGNOSIS — Z78.9 NON-SMOKER: ICD-10-CM

## 2020-11-04 DIAGNOSIS — Z34.92 PRENATAL CARE IN SECOND TRIMESTER: Primary | ICD-10-CM

## 2020-11-04 LAB
GLUCOSE UR STRIP-MCNC: NEGATIVE MG/DL
PROT UR STRIP-MCNC: NEGATIVE MG/DL

## 2020-11-04 PROCEDURE — 0502F SUBSEQUENT PRENATAL CARE: CPT | Performed by: OBSTETRICS & GYNECOLOGY

## 2020-11-04 RX ORDER — SERTRALINE HYDROCHLORIDE 25 MG/1
25 TABLET, FILM COATED ORAL DAILY
Qty: 30 TABLET | Refills: 6 | Status: SHIPPED | OUTPATIENT
Start: 2020-11-04 | End: 2021-01-15

## 2020-11-04 NOTE — PROGRESS NOTES
Reason for visit: Routine OB visit at 17w6d     CC:  Patient without complaints.    ROS: All systems reviewed and are negative with exception of the following    Vitals and other date found in the     Exam  HEENT: NCAT, EMOI  Abdomen is soft and nontender.  Uterus is consistent with EGA  Ext are NT    Impression  Diagnoses and all orders for this visit:    1. Prenatal care in second trimester (Primary)    2. Non-smoker         Vaginal bleeding warnings were given.  Pelvic pain warnings were given.  Nausea and vomiting warnings reviewed.  Patient was instructed to call the office for any concerns or problems.    Ultrasound for next appt not indicated    Derrick Salcido MD

## 2020-12-04 ENCOUNTER — TELEMEDICINE (OUTPATIENT)
Dept: OBSTETRICS AND GYNECOLOGY | Facility: CLINIC | Age: 30
End: 2020-12-04

## 2020-12-04 ENCOUNTER — TELEPHONE (OUTPATIENT)
Dept: OBSTETRICS AND GYNECOLOGY | Facility: CLINIC | Age: 30
End: 2020-12-04

## 2020-12-04 DIAGNOSIS — Z78.9 NON-SMOKER: ICD-10-CM

## 2020-12-04 DIAGNOSIS — Z34.92 PRENATAL CARE IN SECOND TRIMESTER: Primary | ICD-10-CM

## 2020-12-04 DIAGNOSIS — O43.199 MARGINAL INSERTION OF UMBILICAL CORD AFFECTING MANAGEMENT OF MOTHER: ICD-10-CM

## 2020-12-04 PROCEDURE — 0502F SUBSEQUENT PRENATAL CARE: CPT | Performed by: OBSTETRICS & GYNECOLOGY

## 2020-12-04 NOTE — PROGRESS NOTES
Reason for visit: Routine OB visit at 22w1d .  Done via telehealth visit.    CC:  Here for routine OBV.  Patient reports normal fetal movement and has no symptoms of labor ( or otherise), denies VB.  No signs or symptoms of pregnancy induced hypertension.    ROS: All systems reviewed and are negative with exception of the following    Vitals, urine, FHT, and exam noted also in the prenatal flow sheet    Exam  HEENT: NCAT, EMOI  Abdomen is soft and nontender.  Uterus is consistent with EGA  Ext are NT    Impression  Diagnoses and all orders for this visit:    1. Prenatal care in second trimester (Primary)    2. Marginal insertion of umbilical cord affecting management of mother    3. Non-smoker              Kick count instructions were reviewed and encouraged.  Labor signs and symptoms were reviewed.  Patient was encouraged to come to hospital or call for bleeding, leakage of fluid or any other concerns.    Preeclampsia warnings were also reviewed with the patient.      Ultrasound for next appt growth for MCI  PGD normal  One hour at next appt.    Derrick Salcido MD

## 2021-01-15 ENCOUNTER — ROUTINE PRENATAL (OUTPATIENT)
Dept: OBSTETRICS AND GYNECOLOGY | Facility: CLINIC | Age: 31
End: 2021-01-15

## 2021-01-15 VITALS — BODY MASS INDEX: 33.52 KG/M2 | DIASTOLIC BLOOD PRESSURE: 84 MMHG | WEIGHT: 227 LBS | SYSTOLIC BLOOD PRESSURE: 140 MMHG

## 2021-01-15 DIAGNOSIS — Z78.9 NON-SMOKER: ICD-10-CM

## 2021-01-15 DIAGNOSIS — Z34.92 PRENATAL CARE IN SECOND TRIMESTER: Primary | ICD-10-CM

## 2021-01-15 DIAGNOSIS — O43.199 MARGINAL INSERTION OF UMBILICAL CORD AFFECTING MANAGEMENT OF MOTHER: ICD-10-CM

## 2021-01-15 LAB
GLUCOSE UR STRIP-MCNC: NEGATIVE MG/DL
PROT UR STRIP-MCNC: NEGATIVE MG/DL

## 2021-01-15 PROCEDURE — 0502F SUBSEQUENT PRENATAL CARE: CPT | Performed by: OBSTETRICS & GYNECOLOGY

## 2021-01-15 NOTE — PROGRESS NOTES
Reason for visit: Routine OB visit at 28w1d     CC:  Here for routine OBV.  Patient reports normal fetal movement and has no symptoms of labor ( or otherise), denies VB.  No signs or symptoms of pregnancy induced hypertension.    ROS: All systems reviewed and are negative with exception of the following    Vitals, urine, FHT, and exam noted also in the prenatal flow sheet    Exam  HEENT: NCAT, EMOI  Abdomen is soft and nontender.  Uterus is consistent with EGA  Ext are NT    Impression  Diagnoses and all orders for this visit:    1. Prenatal care in second trimester (Primary)  -     Gestational Screen 1 Hr ([facility])  -     Hemoglobin    2. Marginal insertion of umbilical cord affecting management of mother    3. Non-smoker              Kick count instructions were reviewed and encouraged.  Labor signs and symptoms were reviewed.  Patient was encouraged to come to hospital or call for bleeding, leakage of fluid or any other concerns.    Preeclampsia warnings were also reviewed with the patient.      Ultrasound for next appt 4D in 4 weeks  One hour today  Feels better off of alber Salcido MD

## 2021-01-16 LAB
GLUCOSE 1H P 50 G GLC PO SERPL-MCNC: 86 MG/DL (ref 65–139)
HGB BLD-MCNC: 12.2 G/DL (ref 12–15.9)

## 2021-01-28 ENCOUNTER — TELEPHONE (OUTPATIENT)
Dept: OBSTETRICS AND GYNECOLOGY | Facility: CLINIC | Age: 31
End: 2021-01-28

## 2021-01-28 NOTE — TELEPHONE ENCOUNTER
Pt called office to inform us that she is going to  her refill of zoloft and will wean herself off at a later date. Pt states that you and her had this conversation. If you want her to do anything different, please let me know. Thank you

## 2021-02-19 ENCOUNTER — ROUTINE PRENATAL (OUTPATIENT)
Dept: OBSTETRICS AND GYNECOLOGY | Facility: CLINIC | Age: 31
End: 2021-02-19

## 2021-02-19 VITALS — BODY MASS INDEX: 34.26 KG/M2 | DIASTOLIC BLOOD PRESSURE: 100 MMHG | WEIGHT: 232 LBS | SYSTOLIC BLOOD PRESSURE: 148 MMHG

## 2021-02-19 DIAGNOSIS — Z34.93 PRENATAL CARE IN THIRD TRIMESTER: ICD-10-CM

## 2021-02-19 DIAGNOSIS — O13.3 GESTATIONAL HYPERTENSION, THIRD TRIMESTER: ICD-10-CM

## 2021-02-19 DIAGNOSIS — Z78.9 NON-SMOKER: ICD-10-CM

## 2021-02-19 DIAGNOSIS — O43.199 MARGINAL INSERTION OF UMBILICAL CORD AFFECTING MANAGEMENT OF MOTHER: Primary | ICD-10-CM

## 2021-02-19 LAB
GLUCOSE UR STRIP-MCNC: NEGATIVE MG/DL
PROT UR STRIP-MCNC: NEGATIVE MG/DL

## 2021-02-19 PROCEDURE — 90715 TDAP VACCINE 7 YRS/> IM: CPT | Performed by: OBSTETRICS & GYNECOLOGY

## 2021-02-19 PROCEDURE — 90471 IMMUNIZATION ADMIN: CPT | Performed by: OBSTETRICS & GYNECOLOGY

## 2021-02-19 PROCEDURE — 0502F SUBSEQUENT PRENATAL CARE: CPT | Performed by: OBSTETRICS & GYNECOLOGY

## 2021-02-19 RX ORDER — SERTRALINE HYDROCHLORIDE 25 MG/1
TABLET, FILM COATED ORAL
COMMUNITY
Start: 2021-01-28 | End: 2021-02-24

## 2021-02-19 NOTE — PROGRESS NOTES
Reason for visit: Routine OB visit at 33w1d     CC:  Here for routine OBV.  Patient reports normal fetal movement and has no symptoms of labor ( or otherise), denies VB.  No signs or symptoms of pregnancy induced hypertension.    ROS: All systems reviewed and are negative with exception of the following    Vitals, urine, FHT, and exam noted also in the prenatal flow sheet    Exam  HEENT: NCAT, EMOI  Abdomen is soft and nontender.  Uterus is consistent with EGA  Ext are NT    Impression  Diagnoses and all orders for this visit:    1. Marginal insertion of umbilical cord affecting management of mother (Primary)    2. Gestational hypertension, third trimester    3. Prenatal care in third trimester    4. Non-smoker              Kick count instructions were reviewed and encouraged.  Labor signs and symptoms were reviewed.  Patient was encouraged to come to hospital or call for bleeding, leakage of fluid or any other concerns.    Preeclampsia warnings were also reviewed with the patient.      Ultrasound for next appt weekly BPP (gHTN)    BP noted. Get labs.  Weekly visits and del at 37 weeks if remains elevated.    Derrick Salcido MD

## 2021-02-20 LAB
ALBUMIN SERPL-MCNC: 3.6 G/DL (ref 3.5–5.2)
ALBUMIN/GLOB SERPL: 1.3 G/DL
ALP SERPL-CCNC: 123 U/L (ref 39–117)
ALT SERPL-CCNC: 11 U/L (ref 1–33)
AST SERPL-CCNC: 20 U/L (ref 1–32)
BASOPHILS # BLD AUTO: 0.03 10*3/MM3 (ref 0–0.2)
BASOPHILS NFR BLD AUTO: 0.3 % (ref 0–1.5)
BILIRUB SERPL-MCNC: 0.2 MG/DL (ref 0–1.2)
BUN SERPL-MCNC: 9 MG/DL (ref 6–20)
BUN/CREAT SERPL: 13.4 (ref 7–25)
CALCIUM SERPL-MCNC: 9.3 MG/DL (ref 8.6–10.5)
CHLORIDE SERPL-SCNC: 103 MMOL/L (ref 98–107)
CO2 SERPL-SCNC: 23.7 MMOL/L (ref 22–29)
CREAT SERPL-MCNC: 0.67 MG/DL (ref 0.57–1)
EOSINOPHIL # BLD AUTO: 0.06 10*3/MM3 (ref 0–0.4)
EOSINOPHIL NFR BLD AUTO: 0.6 % (ref 0.3–6.2)
ERYTHROCYTE [DISTWIDTH] IN BLOOD BY AUTOMATED COUNT: 12.8 % (ref 12.3–15.4)
GLOBULIN SER CALC-MCNC: 2.7 GM/DL
GLUCOSE SERPL-MCNC: 87 MG/DL (ref 65–99)
HCT VFR BLD AUTO: 38.7 % (ref 34–46.6)
HGB BLD-MCNC: 12.4 G/DL (ref 12–15.9)
IMM GRANULOCYTES # BLD AUTO: 0.02 10*3/MM3 (ref 0–0.05)
IMM GRANULOCYTES NFR BLD AUTO: 0.2 % (ref 0–0.5)
LYMPHOCYTES # BLD AUTO: 2.49 10*3/MM3 (ref 0.7–3.1)
LYMPHOCYTES NFR BLD AUTO: 23.7 % (ref 19.6–45.3)
MCH RBC QN AUTO: 28.5 PG (ref 26.6–33)
MCHC RBC AUTO-ENTMCNC: 32 G/DL (ref 31.5–35.7)
MCV RBC AUTO: 89 FL (ref 79–97)
MONOCYTES # BLD AUTO: 0.61 10*3/MM3 (ref 0.1–0.9)
MONOCYTES NFR BLD AUTO: 5.8 % (ref 5–12)
NEUTROPHILS # BLD AUTO: 7.28 10*3/MM3 (ref 1.7–7)
NEUTROPHILS NFR BLD AUTO: 69.4 % (ref 42.7–76)
NRBC BLD AUTO-RTO: 0 /100 WBC (ref 0–0.2)
PLATELET # BLD AUTO: 298 10*3/MM3 (ref 140–450)
POTASSIUM SERPL-SCNC: 4.1 MMOL/L (ref 3.5–5.2)
PROT SERPL-MCNC: 6.3 G/DL (ref 6–8.5)
RBC # BLD AUTO: 4.35 10*6/MM3 (ref 3.77–5.28)
SODIUM SERPL-SCNC: 138 MMOL/L (ref 136–145)
URATE SERPL-MCNC: 4.6 MG/DL (ref 2.4–5.7)
WBC # BLD AUTO: 10.49 10*3/MM3 (ref 3.4–10.8)

## 2021-02-23 LAB
PROT 24H UR-MRATE: 157.5 MG/24HOURS (ref 0–150)
PROT UR-MCNC: 9 MG/DL

## 2021-02-24 ENCOUNTER — HOSPITAL ENCOUNTER (OUTPATIENT)
Facility: HOSPITAL | Age: 31
Setting detail: OBSERVATION
Discharge: HOME | End: 2021-02-24
Attending: OBSTETRICS & GYNECOLOGY | Admitting: OBSTETRICS & GYNECOLOGY

## 2021-02-24 ENCOUNTER — ROUTINE PRENATAL (OUTPATIENT)
Dept: OBSTETRICS AND GYNECOLOGY | Facility: CLINIC | Age: 31
End: 2021-02-24

## 2021-02-24 VITALS
BODY MASS INDEX: 33.93 KG/M2 | SYSTOLIC BLOOD PRESSURE: 146 MMHG | HEART RATE: 73 BPM | HEIGHT: 70 IN | TEMPERATURE: 97.4 F | DIASTOLIC BLOOD PRESSURE: 93 MMHG | WEIGHT: 237 LBS | RESPIRATION RATE: 18 BRPM

## 2021-02-24 VITALS — WEIGHT: 237 LBS | DIASTOLIC BLOOD PRESSURE: 102 MMHG | BODY MASS INDEX: 35 KG/M2 | SYSTOLIC BLOOD PRESSURE: 160 MMHG

## 2021-02-24 DIAGNOSIS — Z78.9 NON-SMOKER: ICD-10-CM

## 2021-02-24 DIAGNOSIS — O13.3 GESTATIONAL HYPERTENSION, THIRD TRIMESTER: ICD-10-CM

## 2021-02-24 DIAGNOSIS — O43.199 MARGINAL INSERTION OF UMBILICAL CORD AFFECTING MANAGEMENT OF MOTHER: Primary | ICD-10-CM

## 2021-02-24 DIAGNOSIS — Z34.93 PRENATAL CARE IN THIRD TRIMESTER: ICD-10-CM

## 2021-02-24 PROBLEM — Z34.90 PREGNANCY: Status: ACTIVE | Noted: 2021-02-24

## 2021-02-24 LAB
ALBUMIN SERPL-MCNC: 3.4 G/DL (ref 3.5–5.2)
ALBUMIN/GLOB SERPL: 1.1 G/DL
ALP SERPL-CCNC: 118 U/L (ref 39–117)
ALT SERPL W P-5'-P-CCNC: 10 U/L (ref 1–33)
ANION GAP SERPL CALCULATED.3IONS-SCNC: 9 MMOL/L (ref 5–15)
AST SERPL-CCNC: 19 U/L (ref 1–32)
BASOPHILS # BLD AUTO: 0.03 10*3/MM3 (ref 0–0.2)
BASOPHILS NFR BLD AUTO: 0.4 % (ref 0–1.5)
BILIRUB SERPL-MCNC: 0.2 MG/DL (ref 0–1.2)
BILIRUB UR QL STRIP: NEGATIVE
BUN SERPL-MCNC: 8 MG/DL (ref 6–20)
BUN/CREAT SERPL: 12.7 (ref 7–25)
CALCIUM SPEC-SCNC: 8.9 MG/DL (ref 8.6–10.5)
CHLORIDE SERPL-SCNC: 105 MMOL/L (ref 98–107)
CLARITY UR: ABNORMAL
CO2 SERPL-SCNC: 24 MMOL/L (ref 22–29)
COLOR UR: YELLOW
CREAT SERPL-MCNC: 0.63 MG/DL (ref 0.57–1)
DEPRECATED RDW RBC AUTO: 38.7 FL (ref 37–54)
EOSINOPHIL # BLD AUTO: 0.16 10*3/MM3 (ref 0–0.4)
EOSINOPHIL NFR BLD AUTO: 1.9 % (ref 0.3–6.2)
ERYTHROCYTE [DISTWIDTH] IN BLOOD BY AUTOMATED COUNT: 12.9 % (ref 12.3–15.4)
EXPIRATION DATE: NORMAL
GFR SERPL CREATININE-BSD FRML MDRD: 113 ML/MIN/1.73
GLOBULIN UR ELPH-MCNC: 3.1 GM/DL
GLUCOSE SERPL-MCNC: 80 MG/DL (ref 65–99)
GLUCOSE UR STRIP-MCNC: NEGATIVE MG/DL
GLUCOSE UR STRIP-MCNC: NEGATIVE MG/DL
HCT VFR BLD AUTO: 34.6 % (ref 34–46.6)
HGB BLD-MCNC: 11.8 G/DL (ref 12–15.9)
HGB UR QL STRIP.AUTO: NEGATIVE
IMM GRANULOCYTES # BLD AUTO: 0.02 10*3/MM3 (ref 0–0.05)
IMM GRANULOCYTES NFR BLD AUTO: 0.2 % (ref 0–0.5)
KETONES UR QL STRIP: NEGATIVE
LEUKOCYTE ESTERASE UR QL STRIP.AUTO: NEGATIVE
LYMPHOCYTES # BLD AUTO: 2.16 10*3/MM3 (ref 0.7–3.1)
LYMPHOCYTES NFR BLD AUTO: 25.2 % (ref 19.6–45.3)
Lab: 1081
MCH RBC QN AUTO: 28.4 PG (ref 26.6–33)
MCHC RBC AUTO-ENTMCNC: 34.1 G/DL (ref 31.5–35.7)
MCV RBC AUTO: 83.2 FL (ref 79–97)
MONOCYTES # BLD AUTO: 0.69 10*3/MM3 (ref 0.1–0.9)
MONOCYTES NFR BLD AUTO: 8.1 % (ref 5–12)
NEUTROPHILS NFR BLD AUTO: 5.51 10*3/MM3 (ref 1.7–7)
NEUTROPHILS NFR BLD AUTO: 64.2 % (ref 42.7–76)
NITRITE UR QL STRIP: NEGATIVE
NRBC BLD AUTO-RTO: 0 /100 WBC (ref 0–0.2)
PH UR STRIP.AUTO: 6 [PH] (ref 5–8)
PLATELET # BLD AUTO: 254 10*3/MM3 (ref 140–450)
PMV BLD AUTO: 11.4 FL (ref 6–12)
POTASSIUM SERPL-SCNC: 4.1 MMOL/L (ref 3.5–5.2)
PROT SERPL-MCNC: 6.5 G/DL (ref 6–8.5)
PROT UR QL STRIP: NEGATIVE
PROT UR STRIP-MCNC: ABNORMAL MG/DL
PROT UR STRIP-MCNC: NEGATIVE MG/DL
RBC # BLD AUTO: 4.16 10*6/MM3 (ref 3.77–5.28)
SODIUM SERPL-SCNC: 138 MMOL/L (ref 136–145)
SP GR UR STRIP: 1.01 (ref 1–1.03)
URATE SERPL-MCNC: 5 MG/DL (ref 2.4–5.7)
UROBILINOGEN UR QL STRIP: ABNORMAL
WBC # BLD AUTO: 8.57 10*3/MM3 (ref 3.4–10.8)

## 2021-02-24 PROCEDURE — G0463 HOSPITAL OUTPT CLINIC VISIT: HCPCS

## 2021-02-24 PROCEDURE — 84550 ASSAY OF BLOOD/URIC ACID: CPT | Performed by: OBSTETRICS & GYNECOLOGY

## 2021-02-24 PROCEDURE — 81003 URINALYSIS AUTO W/O SCOPE: CPT | Performed by: OBSTETRICS & GYNECOLOGY

## 2021-02-24 PROCEDURE — 80053 COMPREHEN METABOLIC PANEL: CPT | Performed by: OBSTETRICS & GYNECOLOGY

## 2021-02-24 PROCEDURE — 36415 COLL VENOUS BLD VENIPUNCTURE: CPT | Performed by: OBSTETRICS & GYNECOLOGY

## 2021-02-24 PROCEDURE — G0378 HOSPITAL OBSERVATION PER HR: HCPCS

## 2021-02-24 PROCEDURE — 85025 COMPLETE CBC W/AUTO DIFF WBC: CPT | Performed by: OBSTETRICS & GYNECOLOGY

## 2021-02-24 PROCEDURE — 0502F SUBSEQUENT PRENATAL CARE: CPT | Performed by: OBSTETRICS & GYNECOLOGY

## 2021-02-24 PROCEDURE — 81002 URINALYSIS NONAUTO W/O SCOPE: CPT | Performed by: OBSTETRICS & GYNECOLOGY

## 2021-02-24 NOTE — PROGRESS NOTES
"Reason for visit: Routine OB visit at 33w6d     CC:  Here for routine OBV.  Patient reports normal fetal movement and has no symptoms of labor ( or otherise), denies VB.  No signs or symptoms of pregnancy induced hypertension.    She has a history of gestational hypertension. She states that her blood pressure was also elevated yesterday; however, it then lowered to 140/90. She notes that she has been experiencing \"brain fog\" and headaches; however, she denies any concerning changes to her headaches as they occur regularly. Sofia reports that she has not experienced a significant amount of swelling until recently. She denies any unusual pain in her abdomen, other than the baby kicking. She denies any history of jimmy COVID. She has not used steroids during the pregnancy.     The biophysical profile obtained today was an 8 out of 8. Her amniotic fluid volume is normal at 17.8. The baby's head is down.     ROS: All systems reviewed and are negative with exception of the following    Vitals, urine, FHT, and exam noted also in the prenatal flow sheet    Exam  HEENT: NCAT, EMOI  Abdomen is soft and nontender.  Uterus is consistent with EGA  Ext are NT    Slight kicking in the upper quadrants.   Her fundal height is appropriate for the gestational age, 33 weeks and 6 days, 1st pregnancy.    Component   Ref Range & Units 2d ago   Total Protein, Urine   mg/dL 9.0    Protein, 24H Urine   0.0 - 150.0 mg/24hours 157.5High       Component   Ref Range & Units 5d ago   Uric Acid   2.4 - 5.7 mg/dL 4.6      Component   Ref Range & Units 5d ago   WBC   3.40 - 10.80 10*3/mm3 10.49    RBC   3.77 - 5.28 10*6/mm3 4.35    Hemoglobin   12.0 - 15.9 g/dL 12.4    Hematocrit   34.0 - 46.6 % 38.7    MCV   79.0 - 97.0 fL 89.0    MCH   26.6 - 33.0 pg 28.5    MCHC   31.5 - 35.7 g/dL 32.0    RDW   12.3 - 15.4 % 12.8    Platelets   140 - 450 10*3/mm3 298    Neutrophil Rel %   42.7 - 76.0 % 69.4    Lymphocyte Rel %   19.6 - 45.3 % 23.7  "   Monocyte Rel %   5.0 - 12.0 % 5.8    Eosinophil Rel %   0.3 - 6.2 % 0.6    Basophil Rel %   0.0 - 1.5 % 0.3    Neutrophils Absolute   1.70 - 7.00 10*3/mm3 7.28High     Lymphocytes Absolute   0.70 - 3.10 10*3/mm3 2.49    Monocytes Absolute   0.10 - 0.90 10*3/mm3 0.61    Eosinophils Absolute   0.00 - 0.40 10*3/mm3 0.06    Basophils Absolute   0.00 - 0.20 10*3/mm3 0.03    Immature Granulocyte Rel %   0.0 - 0.5 % 0.2    Immature Grans Absolute   0.00 - 0.05 10*3/mm3 0.02    nRBC   0.0 - 0.2 /100 WBC 0.0        Specimen Information: Blood        Component   Ref Range & Units 5d ago   Glucose   65 - 99 mg/dL 87    BUN   6 - 20 mg/dL 9    Creatinine   0.57 - 1.00 mg/dL 0.67    eGFR Non African Am   >60 mL/min/1.73 105    Comment: GFR Normal >60   Chronic Kidney Disease <60   Kidney Failure <15    eGFR African Am   >60 mL/min/1.73 127    BUN/Creatinine Ratio   7.0 - 25.0 13.4    Sodium   136 - 145 mmol/L 138    Potassium   3.5 - 5.2 mmol/L 4.1    Chloride   98 - 107 mmol/L 103    Total CO2   22.0 - 29.0 mmol/L 23.7    Calcium   8.6 - 10.5 mg/dL 9.3    Total Protein   6.0 - 8.5 g/dL 6.3    Albumin   3.50 - 5.20 g/dL 3.60    Globulin   gm/dL 2.7    A/G Ratio   g/dL 1.3    Total Bilirubin   0.0 - 1.2 mg/dL 0.2    Alkaline Phosphatase   39 - 117 U/L 123High     AST (SGOT)   1 - 32 U/L 20    ALT (SGPT)   1 - 33 U/L 11            TRANSABDOMINAL US 02/19/2021    Indication: Marginal cord insertion  No comparison made   Interpretation: Estimated fetal weight 2251 g, 57.9%, amniotic fluid index 21.15 cm, fetal heart rate 141, vertex presentation with an anterior placenta.     Derrick Salcido MD    Impression  Diagnoses and all orders for this visit:    1. Marginal insertion of umbilical cord affecting management of mother (Primary)    2. Gestational hypertension, third trimester    3. Prenatal care in third trimester    4. Non-smoker          Kick count instructions were reviewed and encouraged.  Labor signs and symptoms were  reviewed.  Patient was encouraged to come to hospital or call for bleeding, leakage of fluid or any other concerns.    Preeclampsia warnings were also reviewed with the patient.    I have informed her that we will obtained biophysical profile ultrasounds weekly now. I have recommended that she go to the labor and delivery department today for monitoring and updated labs.     Derrick Salcido MD    Scribed for Derrick Salcido MD by CHINMAY NICHOLSON.  02/24/21   10:14 CST    I have personally performed the services described in this document as scribed by the above individual, and it is both accurate and complete.  Derrick Salcido MD  2/24/2021  12:49 CST

## 2021-03-03 ENCOUNTER — ROUTINE PRENATAL (OUTPATIENT)
Dept: OBSTETRICS AND GYNECOLOGY | Facility: CLINIC | Age: 31
End: 2021-03-03

## 2021-03-03 ENCOUNTER — HOSPITAL ENCOUNTER (OUTPATIENT)
Facility: HOSPITAL | Age: 31
Setting detail: OBSERVATION
Discharge: HOME | End: 2021-03-03
Attending: OBSTETRICS & GYNECOLOGY | Admitting: OBSTETRICS & GYNECOLOGY

## 2021-03-03 VITALS
WEIGHT: 242 LBS | BODY MASS INDEX: 35.84 KG/M2 | RESPIRATION RATE: 18 BRPM | DIASTOLIC BLOOD PRESSURE: 89 MMHG | HEIGHT: 69 IN | HEART RATE: 62 BPM | SYSTOLIC BLOOD PRESSURE: 146 MMHG | TEMPERATURE: 97.5 F

## 2021-03-03 VITALS — BODY MASS INDEX: 34.72 KG/M2 | SYSTOLIC BLOOD PRESSURE: 172 MMHG | WEIGHT: 242 LBS | DIASTOLIC BLOOD PRESSURE: 106 MMHG

## 2021-03-03 DIAGNOSIS — Z34.93 PRENATAL CARE IN THIRD TRIMESTER: ICD-10-CM

## 2021-03-03 DIAGNOSIS — Z78.9 NON-SMOKER: ICD-10-CM

## 2021-03-03 DIAGNOSIS — O13.3 GESTATIONAL HYPERTENSION, THIRD TRIMESTER: Primary | ICD-10-CM

## 2021-03-03 LAB
ABO GROUP BLD: NORMAL
ALBUMIN SERPL-MCNC: 3.1 G/DL (ref 3.5–5.2)
ALBUMIN/GLOB SERPL: 1 G/DL
ALP SERPL-CCNC: 110 U/L (ref 39–117)
ALT SERPL W P-5'-P-CCNC: 11 U/L (ref 1–33)
ANION GAP SERPL CALCULATED.3IONS-SCNC: 6 MMOL/L (ref 5–15)
AST SERPL-CCNC: 19 U/L (ref 1–32)
BASOPHILS # BLD AUTO: 0.03 10*3/MM3 (ref 0–0.2)
BASOPHILS NFR BLD AUTO: 0.3 % (ref 0–1.5)
BILIRUB SERPL-MCNC: 0.2 MG/DL (ref 0–1.2)
BLD GP AB SCN SERPL QL: NEGATIVE
BUN SERPL-MCNC: 11 MG/DL (ref 6–20)
BUN/CREAT SERPL: 17.7 (ref 7–25)
CALCIUM SPEC-SCNC: 8.6 MG/DL (ref 8.6–10.5)
CHLORIDE SERPL-SCNC: 107 MMOL/L (ref 98–107)
CO2 SERPL-SCNC: 22 MMOL/L (ref 22–29)
CREAT SERPL-MCNC: 0.62 MG/DL (ref 0.57–1)
DEPRECATED RDW RBC AUTO: 37.7 FL (ref 37–54)
EOSINOPHIL # BLD AUTO: 0.11 10*3/MM3 (ref 0–0.4)
EOSINOPHIL NFR BLD AUTO: 1.3 % (ref 0.3–6.2)
ERYTHROCYTE [DISTWIDTH] IN BLOOD BY AUTOMATED COUNT: 12.6 % (ref 12.3–15.4)
EXPIRATION DATE: ABNORMAL
GFR SERPL CREATININE-BSD FRML MDRD: 115 ML/MIN/1.73
GLOBULIN UR ELPH-MCNC: 3 GM/DL
GLUCOSE SERPL-MCNC: 85 MG/DL (ref 65–99)
GLUCOSE UR STRIP-MCNC: NEGATIVE MG/DL
HCT VFR BLD AUTO: 33.4 % (ref 34–46.6)
HGB BLD-MCNC: 11.5 G/DL (ref 12–15.9)
IMM GRANULOCYTES # BLD AUTO: 0.03 10*3/MM3 (ref 0–0.05)
IMM GRANULOCYTES NFR BLD AUTO: 0.3 % (ref 0–0.5)
LYMPHOCYTES # BLD AUTO: 2.19 10*3/MM3 (ref 0.7–3.1)
LYMPHOCYTES NFR BLD AUTO: 25 % (ref 19.6–45.3)
Lab: 1081
MCH RBC QN AUTO: 28.3 PG (ref 26.6–33)
MCHC RBC AUTO-ENTMCNC: 34.4 G/DL (ref 31.5–35.7)
MCV RBC AUTO: 82.1 FL (ref 79–97)
MONOCYTES # BLD AUTO: 0.61 10*3/MM3 (ref 0.1–0.9)
MONOCYTES NFR BLD AUTO: 7 % (ref 5–12)
NEUTROPHILS NFR BLD AUTO: 5.8 10*3/MM3 (ref 1.7–7)
NEUTROPHILS NFR BLD AUTO: 66.1 % (ref 42.7–76)
NRBC BLD AUTO-RTO: 0 /100 WBC (ref 0–0.2)
PLATELET # BLD AUTO: 267 10*3/MM3 (ref 140–450)
PMV BLD AUTO: 11.5 FL (ref 6–12)
POTASSIUM SERPL-SCNC: 4.3 MMOL/L (ref 3.5–5.2)
PROT SERPL-MCNC: 6.1 G/DL (ref 6–8.5)
PROT UR STRIP-MCNC: ABNORMAL MG/DL
PROT UR STRIP-MCNC: ABNORMAL MG/DL
RBC # BLD AUTO: 4.07 10*6/MM3 (ref 3.77–5.28)
RH BLD: POSITIVE
SODIUM SERPL-SCNC: 135 MMOL/L (ref 136–145)
T&S EXPIRATION DATE: NORMAL
URATE SERPL-MCNC: 5.5 MG/DL (ref 2.4–5.7)
WBC # BLD AUTO: 8.77 10*3/MM3 (ref 3.4–10.8)

## 2021-03-03 PROCEDURE — 86850 RBC ANTIBODY SCREEN: CPT | Performed by: OBSTETRICS & GYNECOLOGY

## 2021-03-03 PROCEDURE — 36415 COLL VENOUS BLD VENIPUNCTURE: CPT | Performed by: OBSTETRICS & GYNECOLOGY

## 2021-03-03 PROCEDURE — G0463 HOSPITAL OUTPT CLINIC VISIT: HCPCS

## 2021-03-03 PROCEDURE — 80053 COMPREHEN METABOLIC PANEL: CPT | Performed by: OBSTETRICS & GYNECOLOGY

## 2021-03-03 PROCEDURE — 86901 BLOOD TYPING SEROLOGIC RH(D): CPT | Performed by: OBSTETRICS & GYNECOLOGY

## 2021-03-03 PROCEDURE — 0502F SUBSEQUENT PRENATAL CARE: CPT | Performed by: OBSTETRICS & GYNECOLOGY

## 2021-03-03 PROCEDURE — 84550 ASSAY OF BLOOD/URIC ACID: CPT | Performed by: OBSTETRICS & GYNECOLOGY

## 2021-03-03 PROCEDURE — 81002 URINALYSIS NONAUTO W/O SCOPE: CPT | Performed by: OBSTETRICS & GYNECOLOGY

## 2021-03-03 PROCEDURE — 99234 HOSP IP/OBS SM DT SF/LOW 45: CPT | Performed by: OBSTETRICS & GYNECOLOGY

## 2021-03-03 PROCEDURE — 86900 BLOOD TYPING SEROLOGIC ABO: CPT | Performed by: OBSTETRICS & GYNECOLOGY

## 2021-03-03 PROCEDURE — 85025 COMPLETE CBC W/AUTO DIFF WBC: CPT | Performed by: OBSTETRICS & GYNECOLOGY

## 2021-03-03 PROCEDURE — G0378 HOSPITAL OBSERVATION PER HR: HCPCS

## 2021-03-03 NOTE — H&P
[facility]   HISTORY AND PHYSICAL    Patient Name: Sofia Caruso  : 1990  MRN: [id number]  Primary Care Physician: Britt Alvarado APRN  Date of admission: 3/3/2021    Subjective   Subjective     Chief Complaint: Gestational hypertension    Patient was observed on labor delivery secondary to severe range gestational hypertension noted in the office earlier today.  She has no signs or symptoms consistent with pregnancy-induced hypertension.  She had a 24-hour urine protein last week which was not consistent with preeclampsia.   testing has been appropriate.    During her observation on labor and delivery, blood pressures have remained in the mild range.  This is consistent with gestational hypertension.  Fetus is remained reassuring with a category 1 tracing.  Serum labs are unremarkable.      Review of Systems   All other systems reviewed and are negative.       Personal History     Past Medical History:   Diagnosis Date   • Depression    • Infertility, female    • Migraine    • Migraine        Past Surgical History:   Procedure Laterality Date   • ADENOIDECTOMY     • APPENDECTOMY     • TONSILLECTOMY     • WISDOM TOOTH EXTRACTION         Family History: family history includes Breast cancer (age of onset: 42) in her paternal aunt; Cancer in an other family member; Colon cancer (age of onset: 45) in her mother; Colon cancer (age of onset: 60) in her paternal uncle; Diabetes in her maternal grandmother; Esophageal cancer (age of onset: 42) in her paternal uncle; Heart disease in her father; Hyperlipidemia in an other family member; Hypertension in an other family member. Otherwise pertinent FHx was reviewed and not pertinent to current issue.    Social History:  reports that she has never smoked. She has never used smokeless tobacco. She reports previous alcohol use. She reports that she does not use drugs.    Home Medications:  prenatal vitamin      Allergies:  No Known Allergies    Objective     Objective     Vitals:  Temp:  [97.5 °F (36.4 °C)] 97.5 °F (36.4 °C)  Heart Rate:  [63-79] 64  Resp:  [18] 18  BP: (129-172)/() 141/83    Physical Exam  Exam conducted with a chaperone present.   Constitutional:       Appearance: Normal appearance. She is obese.   HENT:      Head: Normocephalic and atraumatic.   Neck:      Musculoskeletal: Normal range of motion and neck supple.   Cardiovascular:      Rate and Rhythm: Normal rate and regular rhythm.      Pulses: Normal pulses.      Heart sounds: Normal heart sounds.   Pulmonary:      Effort: Pulmonary effort is normal.      Breath sounds: Normal breath sounds.   Abdominal:      Palpations: Abdomen is soft.   Musculoskeletal: Normal range of motion.   Skin:     General: Skin is warm and dry.   Neurological:      General: No focal deficit present.      Mental Status: She is alert and oriented to person, place, and time. Mental status is at baseline.      Deep Tendon Reflexes: Reflexes normal.   Psychiatric:         Mood and Affect: Mood normal.         Behavior: Behavior normal.         Thought Content: Thought content normal.         Judgment: Judgment normal.         Result Review    Result Review:  I have personally reviewed the results from the time of this admission to 03/03/21 12:28 PM CST and agree with these findings:  [x]  Laboratory  []  Microbiology  []  Radiology  []  EKG/Telemetry   []  Cardiology/Vascular   []  Pathology  []  Old records  []  Other:  Most notable findings include: Normal platelets and liver function tests    Assessment/Plan   Assessment / Plan     Brief Patient Summary:  Sofia Caruso is a 30 y.o. female who presented to labor and delivery for elevation of blood pressure.  Blood pressures have been consistent with gestational hypertension.  Laboratory analysis was reassuring.  Fetal status is reassuring.    Active Hospital Problems:  Active Hospital Problems    Diagnosis   • Pregnancy       Plan:   24-hour urine collection as an  outpatient  Short-term follow-up in 2 days  Preeclampsia warning signs and symptoms reviewed  Modified bedrest      DVT prophylaxis: Ambulation at home    CODE STATUS:    There are no questions and answers to display.       Admission Status:  I believe this patient meets outpatient management status.    Electronically signed by Derrick Salcido MD, 03/03/21, 12:28 PM CST.

## 2021-03-03 NOTE — DISCHARGE INSTRUCTIONS
24 hour urine starting today and return on Thursday  Follow up appointment on Friday with BPP with dr marquez or dr. altman

## 2021-03-03 NOTE — NURSING NOTE
Patient instructed on 24 hour urine and when to return the urine, also instructed to return it to outpatient lab and for it to be ran in the hospital, patient also instructed on bedrest activity and to keep appointment on friday

## 2021-03-03 NOTE — PROGRESS NOTES
Reason for visit: Routine OB visit at 34w6d     CC:  Sofia Caruso presents today for a routine OBV at 34 weeks. This is the patient's first pregnancy and is complicated by gestational hypertension. She reports her headaches are still present but remain unchanged. She reports she does not feel good and is fatigued, but attributes this to the gestational hypertension.    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     The patient reports normal fetal movement and has no symptoms of labor ( or otherwise), denies VB.    ROS: All systems reviewed and are negative with exception of the following    Vitals, urine, FHT, and exam noted also in the prenatal flow sheet    Exam  HEENT: NCAT, EMOI  Abdomen is soft and nontender.  Uterus is consistent with EGA  Ext are NT    Biophysical profile done today 2021 shows a biophysical profile of 8 out of 8.   Normal amniotic fluid volume.  Baby is in vertex presentation.    Impression  Diagnoses and all orders for this visit:    1. Gestational hypertension, third trimester (Primary)    2. Prenatal care in third trimester    3. Non-smoker        The patient was counseled on preeclampsia, gestational hypertension, and delivery options. She will be sent to labor and delivery to monitor her blood pressure and obtain more labs.     Kick count instructions were reviewed and encouraged.  Labor signs and symptoms were reviewed.  The patient was encouraged to come to hospital or call for bleeding, leakage of fluid or any other concerns.    CHINMAY NICHOLSON    Scribed for Derrick Salcido MD by CHINMAY NICHOLSON.  21   11:10 CST    I have personally performed the services described in this document as scribed by the above individual, and it is both accurate and complete.  Derrick Salcido MD  3/3/2021  11:12 CST

## 2021-03-04 ENCOUNTER — LAB (OUTPATIENT)
Dept: LAB | Facility: HOSPITAL | Age: 31
End: 2021-03-04

## 2021-03-04 LAB — PROT 24H UR-MRATE: 511.5 MG/24HOURS (ref 0–150)

## 2021-03-04 PROCEDURE — 84156 ASSAY OF PROTEIN URINE: CPT | Performed by: OBSTETRICS & GYNECOLOGY

## 2021-03-04 PROCEDURE — 81050 URINALYSIS VOLUME MEASURE: CPT | Performed by: OBSTETRICS & GYNECOLOGY

## 2021-03-05 ENCOUNTER — ROUTINE PRENATAL (OUTPATIENT)
Dept: OBSTETRICS AND GYNECOLOGY | Facility: CLINIC | Age: 31
End: 2021-03-05

## 2021-03-05 ENCOUNTER — PREP FOR SURGERY (OUTPATIENT)
Dept: OTHER | Facility: HOSPITAL | Age: 31
End: 2021-03-05

## 2021-03-05 ENCOUNTER — HOSPITAL ENCOUNTER (INPATIENT)
Facility: HOSPITAL | Age: 31
LOS: 6 days | Discharge: HOME | End: 2021-03-11
Attending: OBSTETRICS & GYNECOLOGY | Admitting: OBSTETRICS & GYNECOLOGY

## 2021-03-05 VITALS — WEIGHT: 238 LBS | BODY MASS INDEX: 35.15 KG/M2 | DIASTOLIC BLOOD PRESSURE: 110 MMHG | SYSTOLIC BLOOD PRESSURE: 168 MMHG

## 2021-03-05 DIAGNOSIS — Z78.9 NON-SMOKER: ICD-10-CM

## 2021-03-05 DIAGNOSIS — O14.00 ANTEPARTUM MILD PREECLAMPSIA: ICD-10-CM

## 2021-03-05 DIAGNOSIS — O13.3 GESTATIONAL HYPERTENSION, THIRD TRIMESTER: Primary | ICD-10-CM

## 2021-03-05 DIAGNOSIS — O43.199 MARGINAL INSERTION OF UMBILICAL CORD AFFECTING MANAGEMENT OF MOTHER: ICD-10-CM

## 2021-03-05 DIAGNOSIS — O14.13 SEVERE PRE-ECLAMPSIA IN THIRD TRIMESTER: Primary | ICD-10-CM

## 2021-03-05 DIAGNOSIS — Z34.93 PRENATAL CARE IN THIRD TRIMESTER: ICD-10-CM

## 2021-03-05 PROBLEM — O14.10 SEVERE PREECLAMPSIA: Status: ACTIVE | Noted: 2021-03-05

## 2021-03-05 LAB
ABO GROUP BLD: NORMAL
ALBUMIN SERPL-MCNC: 3.4 G/DL (ref 3.5–5.2)
ALBUMIN/GLOB SERPL: 1 G/DL
ALP SERPL-CCNC: 126 U/L (ref 39–117)
ALT SERPL W P-5'-P-CCNC: 11 U/L (ref 1–33)
ANION GAP SERPL CALCULATED.3IONS-SCNC: 11 MMOL/L (ref 5–15)
AST SERPL-CCNC: 16 U/L (ref 1–32)
BILIRUB SERPL-MCNC: 0.2 MG/DL (ref 0–1.2)
BLD GP AB SCN SERPL QL: NEGATIVE
BUN SERPL-MCNC: 11 MG/DL (ref 6–20)
BUN/CREAT SERPL: 16.2 (ref 7–25)
CALCIUM SPEC-SCNC: 9 MG/DL (ref 8.6–10.5)
CHLORIDE SERPL-SCNC: 105 MMOL/L (ref 98–107)
CO2 SERPL-SCNC: 21 MMOL/L (ref 22–29)
CREAT SERPL-MCNC: 0.68 MG/DL (ref 0.57–1)
DEPRECATED RDW RBC AUTO: 37.9 FL (ref 37–54)
ERYTHROCYTE [DISTWIDTH] IN BLOOD BY AUTOMATED COUNT: 12.8 % (ref 12.3–15.4)
EXPIRATION DATE: ABNORMAL
GFR SERPL CREATININE-BSD FRML MDRD: 103 ML/MIN/1.73
GLOBULIN UR ELPH-MCNC: 3.4 GM/DL
GLUCOSE SERPL-MCNC: 83 MG/DL (ref 65–99)
GLUCOSE UR STRIP-MCNC: NEGATIVE MG/DL
HCT VFR BLD AUTO: 35.4 % (ref 34–46.6)
HGB BLD-MCNC: 12.4 G/DL (ref 12–15.9)
Lab: 1081
MCH RBC QN AUTO: 28.6 PG (ref 26.6–33)
MCHC RBC AUTO-ENTMCNC: 35 G/DL (ref 31.5–35.7)
MCV RBC AUTO: 81.6 FL (ref 79–97)
PLATELET # BLD AUTO: 284 10*3/MM3 (ref 140–450)
PMV BLD AUTO: 11.4 FL (ref 6–12)
POTASSIUM SERPL-SCNC: 4.3 MMOL/L (ref 3.5–5.2)
PROT SERPL-MCNC: 6.8 G/DL (ref 6–8.5)
PROT UR STRIP-MCNC: ABNORMAL MG/DL
PROT UR STRIP-MCNC: ABNORMAL MG/DL
RBC # BLD AUTO: 4.34 10*6/MM3 (ref 3.77–5.28)
RH BLD: POSITIVE
SODIUM SERPL-SCNC: 137 MMOL/L (ref 136–145)
T&S EXPIRATION DATE: NORMAL
URATE SERPL-MCNC: 5.8 MG/DL (ref 2.4–5.7)
WBC # BLD AUTO: 8.35 10*3/MM3 (ref 3.4–10.8)

## 2021-03-05 PROCEDURE — 0502F SUBSEQUENT PRENATAL CARE: CPT | Performed by: OBSTETRICS & GYNECOLOGY

## 2021-03-05 PROCEDURE — 25010000002 ONDANSETRON PER 1 MG: Performed by: OBSTETRICS & GYNECOLOGY

## 2021-03-05 PROCEDURE — 81002 URINALYSIS NONAUTO W/O SCOPE: CPT | Performed by: OBSTETRICS & GYNECOLOGY

## 2021-03-05 PROCEDURE — 84550 ASSAY OF BLOOD/URIC ACID: CPT | Performed by: OBSTETRICS & GYNECOLOGY

## 2021-03-05 PROCEDURE — 80053 COMPREHEN METABOLIC PANEL: CPT | Performed by: OBSTETRICS & GYNECOLOGY

## 2021-03-05 PROCEDURE — 3E0D7GC INTRODUCTION OF OTHER THERAPEUTIC SUBSTANCE INTO MOUTH AND PHARYNX, VIA NATURAL OR ARTIFICIAL OPENING: ICD-10-PCS | Performed by: OBSTETRICS & GYNECOLOGY

## 2021-03-05 PROCEDURE — 86901 BLOOD TYPING SEROLOGIC RH(D): CPT | Performed by: OBSTETRICS & GYNECOLOGY

## 2021-03-05 PROCEDURE — 86850 RBC ANTIBODY SCREEN: CPT | Performed by: OBSTETRICS & GYNECOLOGY

## 2021-03-05 PROCEDURE — 86900 BLOOD TYPING SEROLOGIC ABO: CPT | Performed by: OBSTETRICS & GYNECOLOGY

## 2021-03-05 PROCEDURE — 85027 COMPLETE CBC AUTOMATED: CPT | Performed by: OBSTETRICS & GYNECOLOGY

## 2021-03-05 RX ORDER — LIDOCAINE HYDROCHLORIDE 10 MG/ML
5 INJECTION, SOLUTION EPIDURAL; INFILTRATION; INTRACAUDAL; PERINEURAL AS NEEDED
Status: DISCONTINUED | OUTPATIENT
Start: 2021-03-05 | End: 2021-03-08 | Stop reason: HOSPADM

## 2021-03-05 RX ORDER — ONDANSETRON 4 MG/1
4 TABLET, FILM COATED ORAL EVERY 6 HOURS PRN
Status: DISCONTINUED | OUTPATIENT
Start: 2021-03-05 | End: 2021-03-08 | Stop reason: HOSPADM

## 2021-03-05 RX ORDER — SODIUM CHLORIDE 0.9 % (FLUSH) 0.9 %
3 SYRINGE (ML) INJECTION EVERY 12 HOURS SCHEDULED
Status: DISCONTINUED | OUTPATIENT
Start: 2021-03-05 | End: 2021-03-08 | Stop reason: HOSPADM

## 2021-03-05 RX ORDER — SODIUM CHLORIDE 0.9 % (FLUSH) 0.9 %
3 SYRINGE (ML) INJECTION EVERY 12 HOURS SCHEDULED
Status: CANCELLED | OUTPATIENT
Start: 2021-03-05

## 2021-03-05 RX ORDER — CYCLOBENZAPRINE HCL 5 MG
5 TABLET ORAL ONCE
Status: COMPLETED | OUTPATIENT
Start: 2021-03-05 | End: 2021-03-05

## 2021-03-05 RX ORDER — SODIUM CHLORIDE 0.9 % (FLUSH) 0.9 %
10 SYRINGE (ML) INJECTION AS NEEDED
Status: CANCELLED | OUTPATIENT
Start: 2021-03-05

## 2021-03-05 RX ORDER — SODIUM CHLORIDE 0.9 % (FLUSH) 0.9 %
1-10 SYRINGE (ML) INJECTION AS NEEDED
Status: DISCONTINUED | OUTPATIENT
Start: 2021-03-05 | End: 2021-03-08 | Stop reason: HOSPADM

## 2021-03-05 RX ORDER — ONDANSETRON 2 MG/ML
4 INJECTION INTRAMUSCULAR; INTRAVENOUS EVERY 6 HOURS PRN
Status: DISCONTINUED | OUTPATIENT
Start: 2021-03-05 | End: 2021-03-08 | Stop reason: HOSPADM

## 2021-03-05 RX ORDER — TERBUTALINE SULFATE 1 MG/ML
0.25 INJECTION, SOLUTION SUBCUTANEOUS AS NEEDED
Status: DISCONTINUED | OUTPATIENT
Start: 2021-03-05 | End: 2021-03-08 | Stop reason: HOSPADM

## 2021-03-05 RX ORDER — SODIUM CHLORIDE 0.9 % (FLUSH) 0.9 %
10 SYRINGE (ML) INJECTION AS NEEDED
Status: DISCONTINUED | OUTPATIENT
Start: 2021-03-05 | End: 2021-03-05

## 2021-03-05 RX ORDER — LABETALOL HYDROCHLORIDE 5 MG/ML
20 INJECTION, SOLUTION INTRAVENOUS ONCE AS NEEDED
Status: COMPLETED | OUTPATIENT
Start: 2021-03-05 | End: 2021-03-05

## 2021-03-05 RX ORDER — LIDOCAINE HYDROCHLORIDE 10 MG/ML
5 INJECTION, SOLUTION EPIDURAL; INFILTRATION; INTRACAUDAL; PERINEURAL AS NEEDED
Status: DISCONTINUED | OUTPATIENT
Start: 2021-03-05 | End: 2021-03-05

## 2021-03-05 RX ORDER — LABETALOL HYDROCHLORIDE 5 MG/ML
40 INJECTION, SOLUTION INTRAVENOUS ONCE AS NEEDED
Status: COMPLETED | OUTPATIENT
Start: 2021-03-05 | End: 2021-03-05

## 2021-03-05 RX ORDER — BUTORPHANOL TARTRATE 1 MG/ML
1 INJECTION, SOLUTION INTRAMUSCULAR; INTRAVENOUS
Status: DISCONTINUED | OUTPATIENT
Start: 2021-03-05 | End: 2021-03-08 | Stop reason: HOSPADM

## 2021-03-05 RX ORDER — BUTORPHANOL TARTRATE 1 MG/ML
2 INJECTION, SOLUTION INTRAMUSCULAR; INTRAVENOUS
Status: DISCONTINUED | OUTPATIENT
Start: 2021-03-05 | End: 2021-03-08 | Stop reason: HOSPADM

## 2021-03-05 RX ORDER — SODIUM CHLORIDE 0.9 % (FLUSH) 0.9 %
3 SYRINGE (ML) INJECTION EVERY 12 HOURS SCHEDULED
Status: DISCONTINUED | OUTPATIENT
Start: 2021-03-05 | End: 2021-03-05

## 2021-03-05 RX ORDER — MISOPROSTOL 100 MCG
25 TABLET ORAL
Status: DISCONTINUED | OUTPATIENT
Start: 2021-03-05 | End: 2021-03-06

## 2021-03-05 RX ORDER — ACETAMINOPHEN 325 MG/1
650 TABLET ORAL EVERY 4 HOURS PRN
Status: DISCONTINUED | OUTPATIENT
Start: 2021-03-05 | End: 2021-03-08 | Stop reason: HOSPADM

## 2021-03-05 RX ORDER — ACETAMINOPHEN 325 MG/1
650 TABLET ORAL ONCE
Status: COMPLETED | OUTPATIENT
Start: 2021-03-05 | End: 2021-03-05

## 2021-03-05 RX ORDER — LIDOCAINE HYDROCHLORIDE 10 MG/ML
5 INJECTION, SOLUTION EPIDURAL; INFILTRATION; INTRACAUDAL; PERINEURAL AS NEEDED
Status: CANCELLED | OUTPATIENT
Start: 2021-03-05

## 2021-03-05 RX ORDER — SODIUM CHLORIDE, SODIUM LACTATE, POTASSIUM CHLORIDE, CALCIUM CHLORIDE 600; 310; 30; 20 MG/100ML; MG/100ML; MG/100ML; MG/100ML
75 INJECTION, SOLUTION INTRAVENOUS CONTINUOUS
Status: DISCONTINUED | OUTPATIENT
Start: 2021-03-05 | End: 2021-03-08

## 2021-03-05 RX ADMIN — MISOPROSTOL 25 MCG: 100 TABLET ORAL at 20:01

## 2021-03-05 RX ADMIN — LABETALOL HYDROCHLORIDE 40 MG: 5 INJECTION, SOLUTION INTRAVENOUS at 12:44

## 2021-03-05 RX ADMIN — SODIUM CHLORIDE, POTASSIUM CHLORIDE, SODIUM LACTATE AND CALCIUM CHLORIDE 125 ML/HR: 600; 310; 30; 20 INJECTION, SOLUTION INTRAVENOUS at 11:57

## 2021-03-05 RX ADMIN — MISOPROSTOL 25 MCG: 100 TABLET ORAL at 11:59

## 2021-03-05 RX ADMIN — CYCLOBENZAPRINE HYDROCHLORIDE 5 MG: 5 TABLET, FILM COATED ORAL at 16:48

## 2021-03-05 RX ADMIN — ACETAMINOPHEN 650 MG: 325 TABLET, FILM COATED ORAL at 11:03

## 2021-03-05 RX ADMIN — ONDANSETRON HYDROCHLORIDE 4 MG: 2 SOLUTION INTRAMUSCULAR; INTRAVENOUS at 19:30

## 2021-03-05 RX ADMIN — MISOPROSTOL 25 MCG: 100 TABLET ORAL at 23:48

## 2021-03-05 RX ADMIN — LABETALOL HYDROCHLORIDE 20 MG: 5 INJECTION, SOLUTION INTRAVENOUS at 12:13

## 2021-03-05 RX ADMIN — MISOPROSTOL 25 MCG: 100 TABLET ORAL at 16:08

## 2021-03-05 NOTE — LACTATION NOTE
Mother's Name:Sofia    Phone #:[phone]  Infant Name:  Lucio  :  Gestation:35w1d  Day of life:  Birth weight:    Discharge weight:  Weight Loss:   24 hour Summary of Feeds:  Voids:  Stools:  Assistive devices (shields, shells, etc):  Significant Maternal history:, IVF pregnancy, Migraines, Depression, severe pre-eclampsia  Maternal Concerns:    Maternal Goal: breastfeeding during maternity leave  Mother's Medications:   Breastpump for home: Spectra  Ped follow up appt:      Induction of labor due to severe pre-eclampsia at 35w1d. Mother desires breastfeeding. With permission of patient, initial breastfeeding packet given and reviewed. Discussed milk supply/demand, utilizing hand expression and skin to skin bonding/feedings, feeding frequency, average voids/stools. Discussed risk of infant being a poor feeder due to GA and feeding plan if poor feeding becomes an issue- potential for need of supplementation and initiation of pumping. Encouraged parents to watch youAseptiaube videos. Mother denies questions at this time. Reiterated lactation department support. Encouragement and support provided.      Instructed mom our lactation team is here for continued support throughout their breastfeeding journey. Our team has encouraged mom to call with any questions or concerns that may arise after discharge.

## 2021-03-05 NOTE — PROGRESS NOTES
Reason for visit: Routine OB visit at 35w1d     CC:  Sofia Caruso is here for routine OBV at 28 weeks gestation. Her pregnancy is complicated by gestational hypertension which started at 28 weeks and now presents for preeclampsia based on her 24-hour urine. She is currently on bedrest since 3/3/2021.    The patient reports normal fetal movement and has no symptoms of labor ( or otherwise), denies VB.    ROS: All systems reviewed and are negative with exception of the following    Vitals, urine, FHT, and exam noted also in the prenatal flow sheet    Exam  HEENT: NCAT, EMOI  Abdomen is soft and nontender.  Uterus is consistent with EGA  Ext are NT    Lab Results   Component Value Date    XZDMCQZ18SF 511.5 (H) 2021    QRQMBNW52YF 157.5 (H) 2021     Impression  Diagnoses and all orders for this visit:    1. Gestational hypertension, third trimester (Primary)    2. Prenatal care in third trimester    3. Marginal insertion of umbilical cord affecting management of mother    4. Non-smoker          The differences of gestational hypertension and preeclampsia were discussed with the patient. She will go down to labor and delivery to have her blood pressure monitored and get blood work. We will evaluate the results and proceed as needed from that point with likely induction versus inpatient monitoring.     Kick count instructions were reviewed and encouraged.  Labor signs and symptoms were reviewed.  Patient was encouraged to come to hospital or call for bleeding, leakage of fluid or any other concerns.    Preeclampsia warnings were also reviewed with the patient.      She will follow up on 2021 to check blood pressure and for an ultrasound. That appointment can be cancelled if needed.    CHINMAY NICHOLSON    Scribed for Derrick Salcido MD by CHINMAY NICHOLSON.  21   12:10 CST    I have personally performed the services described in this document as scribed by the above individual, and it is both accurate  and complete.  Derrick Salcido MD  3/5/2021  12:12 CST

## 2021-03-05 NOTE — PROGRESS NOTES
Derrick Salcido MD  [facility]  [address]  [place] [address]  Office [phone]  Fax [phone]      [facility]  Sofia Caruso  : 1990  MRN: [id number]  CSN: [id number]    Labor progress note    Subjective   She reports Headache     Objective   Min/max vitals past 24 hours:  Temp  Min: 98.2 °F (36.8 °C)  Max: 99.4 °F (37.4 °C)   BP  Min: 129/81  Max: 173/106   Pulse  Min: 63  Max: 95   Resp  Min: 18  Max: 18        FHT's: reactive and category 1.  external monitors used   Cervix: was not checked.   Contractions: none      Assessment   1. IUP at 35w1d  2. Severe preeclampsia based on blood pressure criteria  3. Headache, questionable tension related  4. Fetal status reassuring     Plan   1.   Flexeril now   2.   Consider starting magnesium if Flexeril does not ease her headache  3.   Continue Cytotec ripening  4.  Discussed with Dr. Sams and care has been transferred    Derrick Salcido MD  3/5/2021  16:32 CST

## 2021-03-05 NOTE — PLAN OF CARE
Problem: Adult Inpatient Plan of Care  Goal: Plan of Care Review  Outcome: Ongoing, Progressing  Flowsheets (Taken 3/5/2021 1445)  Progress: no change  Plan of Care Reviewed With:   patient   spouse  Outcome Summary: IOL for elevated BP's , hypertensive protocol initated. Cytotec x 5 doses then pitocin. Pt denies pain at this time.   Goal Outcome Evaluation:  Plan of Care Reviewed With: patient, spouse  Progress: no change  Outcome Summary: IOL for elevated BP's , hypertensive protocol initated. Cytotec x 5 doses then pitocin. Pt denies pain at this time.

## 2021-03-05 NOTE — H&P
Derrick Salcido MD  [facility]  [address]  [place] [address]  Office [phone]  Fax [phone]      [facility]  Sofia Caruso  : 1990  MRN: [id number]  CSN: [id number]    History and Physical    Subjective   Sofia Caruso is a 30 y.o. year old  with an Estimated Date of Delivery: 21 presenting for induction of labor for severe preeclampsia.  Diagnosis is based on blood pressure criteria as well as a headache.  She presently reports a headache but has somewhat improved with Tylenol.  However, blood pressure readings have reliably been in the severe range since her presentation today as well as recent checks in the office.  She has not had 2 back-to-back readings to necessitate IV treatment but she has had greater than 160 systolic and greater than 110 diastolic on at least 2 different occasions.    Risks of labor induction including prolongation of labor, increased risks for both  section and operative vaginal birth have been discussed at length.     Prenatal care has been with Dr. Salcido  It has been complicated by severe pre-eclampsia.    OB History    Para Term  AB Living   1 0 0 0 0 0   SAB TAB Ectopic Molar Multiple Live Births   0 0 0 0 0 0      # Outcome Date GA Lbr Immanuel/2nd Weight Sex Delivery Anes PTL Lv   1 Current                Past Medical History:   Diagnosis Date   • Depression    • Infertility, female    • Migraine    • Migraine        Past Surgical History:   Procedure Laterality Date   • ADENOIDECTOMY     • APPENDECTOMY     • TONSILLECTOMY     • WISDOM TOOTH EXTRACTION           Current Facility-Administered Medications:   •  acetaminophen (TYLENOL) tablet 650 mg, 650 mg, Oral, Q4H PRN, Derrick Salcido MD  •  butorphanol (STADOL) injection 1 mg, 1 mg, Intravenous, Q3H PRN, Derrick Salcido MD  •  butorphanol (STADOL) injection 2 mg, 2 mg, Intravenous, Q3H PRN, Derrick Salcido MD  •  lactated ringers infusion, 125 mL/hr,  "Intravenous, Continuous, Derrick Salcido MD  •  lidocaine PF 1% (XYLOCAINE) injection 5 mL, 5 mL, Intradermal, PRN, Derrick Salcido MD  •  miSOPROStol (CYTOTEC) split tablet 25 mcg, 25 mcg, Oral, Q4H, Derrick Salcido MD  •  ondansetron (ZOFRAN) tablet 4 mg, 4 mg, Oral, Q6H PRN **OR** ondansetron (ZOFRAN) injection 4 mg, 4 mg, Intravenous, Q6H PRN, Derrick Salcido MD  •  sodium chloride 0.9 % flush 1-10 mL, 1-10 mL, Intravenous, PRN, Derrick Salcido MD  •  sodium chloride 0.9 % flush 3 mL, 3 mL, Intravenous, Q12H, Derrick Salcido MD  •  terbutaline (BRETHINE) injection 0.25 mg, 0.25 mg, Subcutaneous, PRN, Derrick Salcido MD    No Known Allergies    Family History   Problem Relation Age of Onset   • Colon cancer Mother 45   • Breast cancer Paternal Aunt 42   • Diabetes Maternal Grandmother    • Hyperlipidemia Other    • Hypertension Other    • Cancer Other    • Heart disease Father    • Colon cancer Paternal Uncle 60   • Esophageal cancer Paternal Uncle 42   • Ovarian cancer Neg Hx    • Uterine cancer Neg Hx    • Melanoma Neg Hx    • Prostate cancer Neg Hx        Social History     Socioeconomic History   • Marital status:      Spouse name: Not on file   • Number of children: Not on file   • Years of education: Not on file   • Highest education level: Not on file   Tobacco Use   • Smoking status: Never Smoker   • Smokeless tobacco: Never Used   Substance and Sexual Activity   • Alcohol use: Not Currently   • Drug use: No   • Sexual activity: Yes     Partners: Male     Birth control/protection: None     Comment: csp for 5 years       Review of Systems        Objective   BP (!) 154/102 (BP Location: Right arm, Patient Position: Sitting)   Pulse 71   Temp 99.4 °F (37.4 °C) (Temporal)   Resp 18   Ht 175.3 cm (69\")   Wt 109 kg (240 lb)   Breastfeeding Yes   BMI 35.44 kg/m²     General: well developed; well nourished  no acute distress   Abdomen: soft, non-tender; bowel sounds normal; no masses, " no organomegaly   Cervix: 0 cm / 20 % / -3 / soft / posterior   Presentation: cephalic based on ultrasound done today in the office (BPP 8 out of 8 and normal amniotic fluid volume)    EFW 5 to 6 pounds by Leopold's  Pelvis subjectively adequate     Prenatal Labs  Lab Results   Component Value Date    HGB 12.4 03/05/2021    RUBELLASCRN Immune 10/08/2014    HEPBSAG Negative 09/02/2020    ABO A 03/05/2021    RH Positive 03/05/2021    ABSCRN Negative 03/05/2021    NFD4OAA9 Non Reactive 09/02/2020    HEPCVIRUSABY Negative 05/17/2016    URINECX Final report 09/02/2020       Current Labs Reviewed   Admission labs, prenatal labs       Assessment   1. IUP with an Estimated Date of Delivery: 4/8/21  2. Induction of labor because of severe preeclampsia based on blood pressure criteria  3. Group B strep status: unknown  4. Fetal status reassuring overall     Plan   1. Cytotec ripening followed by pitocin per protocol   2. Magnesium sulfate when indicated  3. Antibiotic prophylaxis in labor  4. As needed blood pressure medicine as indicated  5. Continuous fetal monitoring  6. Continuous tocometer    Derrick Salcido MD  3/5/2021  11:31 CST

## 2021-03-06 PROCEDURE — 3E033VJ INTRODUCTION OF OTHER HORMONE INTO PERIPHERAL VEIN, PERCUTANEOUS APPROACH: ICD-10-PCS | Performed by: OBSTETRICS & GYNECOLOGY

## 2021-03-06 PROCEDURE — 25010000002 METOCLOPRAMIDE PER 10 MG: Performed by: OBSTETRICS & GYNECOLOGY

## 2021-03-06 RX ORDER — METOCLOPRAMIDE HYDROCHLORIDE 5 MG/ML
10 INJECTION INTRAMUSCULAR; INTRAVENOUS ONCE
Status: COMPLETED | OUTPATIENT
Start: 2021-03-06 | End: 2021-03-06

## 2021-03-06 RX ORDER — HYDROXYZINE HYDROCHLORIDE 25 MG/1
50 TABLET, FILM COATED ORAL NIGHTLY PRN
Status: DISCONTINUED | OUTPATIENT
Start: 2021-03-06 | End: 2021-03-08

## 2021-03-06 RX ORDER — OXYTOCIN/0.9 % SODIUM CHLORIDE 30/500 ML
2-20 PLASTIC BAG, INJECTION (ML) INTRAVENOUS
Status: DISCONTINUED | OUTPATIENT
Start: 2021-03-06 | End: 2021-03-08

## 2021-03-06 RX ADMIN — ACETAMINOPHEN 650 MG: 325 TABLET, FILM COATED ORAL at 03:57

## 2021-03-06 RX ADMIN — MISOPROSTOL 25 MCG: 100 TABLET ORAL at 03:57

## 2021-03-06 RX ADMIN — DINOPROSTONE 10 MG: 10 INSERT VAGINAL at 09:05

## 2021-03-06 RX ADMIN — METOCLOPRAMIDE 10 MG: 5 INJECTION, SOLUTION INTRAMUSCULAR; INTRAVENOUS at 08:15

## 2021-03-06 RX ADMIN — SODIUM CHLORIDE, POTASSIUM CHLORIDE, SODIUM LACTATE AND CALCIUM CHLORIDE 125 ML/HR: 600; 310; 30; 20 INJECTION, SOLUTION INTRAVENOUS at 19:26

## 2021-03-06 NOTE — PROGRESS NOTES
Sharif Sams MD  [facility]  [address]  [place] [address]  Office [phone]  Fax [phone]      [facility]  Sofia Caruso  : 1990  MRN: [id number]  CSN: [id number]    Labor progress note    Subjective   She has had 5 doses of Cytotec without effect. Her headache resolved last night but has returned as a mild headache this morning.     Objective   Min/max vitals past 24 hours:  Temp  Min: 98.2 °F (36.8 °C)  Max: 99.4 °F (37.4 °C)   BP  Min: 110/66  Max: 173/106   Pulse  Min: 59  Max: 95   Resp  Min: 18  Max: 18        FHT's: reactive and category 1.  external monitors used   Cervix: was checked (by me): 0.5 cm / 40 % / -3   Contractions:  DTR: irregular   1+      Assessment   1. IUP at 35w2d  2. Severe Pre-eclampsia     Plan   1.   Cytotec has not been succesful in cervical ripening. Will switch to Cervidil and allow her to eat breakfast.  Allow labor to continue pending maternal and fetal status  Plan discussed with family and questions answered.  Understanding verbalized.    Sharif Sams MD  3/6/2021  07:41 CST

## 2021-03-06 NOTE — PLAN OF CARE
Goal Outcome Evaluation:  Plan of Care Reviewed With: patient, spouse  Progress: improving  Outcome Summary: VSS; Fourth dose of cytotec given at 0359; Pt denies pain/headache at this time; slept throughout the night

## 2021-03-07 ENCOUNTER — ANESTHESIA (OUTPATIENT)
Dept: LABOR AND DELIVERY | Facility: HOSPITAL | Age: 31
End: 2021-03-07

## 2021-03-07 ENCOUNTER — ANESTHESIA EVENT (OUTPATIENT)
Dept: LABOR AND DELIVERY | Facility: HOSPITAL | Age: 31
End: 2021-03-07

## 2021-03-07 PROCEDURE — 25010000002 PENICILLIN G POTASSIUM PER 600000 UNITS: Performed by: OBSTETRICS & GYNECOLOGY

## 2021-03-07 PROCEDURE — 25010000002 ROPIVACAINE PER 1 MG: Performed by: NURSE ANESTHETIST, CERTIFIED REGISTERED

## 2021-03-07 PROCEDURE — 10907ZC DRAINAGE OF AMNIOTIC FLUID, THERAPEUTIC FROM PRODUCTS OF CONCEPTION, VIA NATURAL OR ARTIFICIAL OPENING: ICD-10-PCS | Performed by: OBSTETRICS & GYNECOLOGY

## 2021-03-07 PROCEDURE — 25010000002 ONDANSETRON PER 1 MG: Performed by: OBSTETRICS & GYNECOLOGY

## 2021-03-07 PROCEDURE — 25010000002 FENTANYL CITRATE (PF) 250 MCG/5ML SOLUTION: Performed by: NURSE ANESTHETIST, CERTIFIED REGISTERED

## 2021-03-07 PROCEDURE — 25010000003 MAGNESIUM SULFATE 20 GM/500ML SOLUTION: Performed by: OBSTETRICS & GYNECOLOGY

## 2021-03-07 RX ORDER — TRISODIUM CITRATE DIHYDRATE AND CITRIC ACID MONOHYDRATE 500; 334 MG/5ML; MG/5ML
30 SOLUTION ORAL ONCE
Status: DISCONTINUED | OUTPATIENT
Start: 2021-03-07 | End: 2021-03-08 | Stop reason: HOSPADM

## 2021-03-07 RX ORDER — LIDOCAINE HYDROCHLORIDE AND EPINEPHRINE 15; 5 MG/ML; UG/ML
INJECTION, SOLUTION EPIDURAL AS NEEDED
Status: DISCONTINUED | OUTPATIENT
Start: 2021-03-07 | End: 2021-03-09 | Stop reason: SURG

## 2021-03-07 RX ORDER — MAGNESIUM SULFATE HEPTAHYDRATE 40 MG/ML
2 INJECTION, SOLUTION INTRAVENOUS CONTINUOUS
Status: DISCONTINUED | OUTPATIENT
Start: 2021-03-07 | End: 2021-03-09

## 2021-03-07 RX ORDER — FENTANYL CITRATE 50 UG/ML
INJECTION, SOLUTION INTRAMUSCULAR; INTRAVENOUS AS NEEDED
Status: DISCONTINUED | OUTPATIENT
Start: 2021-03-07 | End: 2021-03-09 | Stop reason: SURG

## 2021-03-07 RX ORDER — PENICILLIN G 3000000 [IU]/50ML
3 INJECTION, SOLUTION INTRAVENOUS EVERY 4 HOURS
Status: DISCONTINUED | OUTPATIENT
Start: 2021-03-07 | End: 2021-03-08

## 2021-03-07 RX ORDER — EPHEDRINE SULFATE 50 MG/ML
10 INJECTION, SOLUTION INTRAVENOUS
Status: DISCONTINUED | OUTPATIENT
Start: 2021-03-07 | End: 2021-03-08 | Stop reason: HOSPADM

## 2021-03-07 RX ADMIN — ONDANSETRON HYDROCHLORIDE 4 MG: 2 SOLUTION INTRAMUSCULAR; INTRAVENOUS at 19:11

## 2021-03-07 RX ADMIN — PENICILLIN G 3 MILLION UNITS: 3000000 INJECTION, SOLUTION INTRAVENOUS at 15:02

## 2021-03-07 RX ADMIN — ROPIVACAINE HYDROCHLORIDE 8 ML/HR: 2 INJECTION, SOLUTION EPIDURAL; INFILTRATION at 19:27

## 2021-03-07 RX ADMIN — PENICILLIN G 3 MILLION UNITS: 3000000 INJECTION, SOLUTION INTRAVENOUS at 22:52

## 2021-03-07 RX ADMIN — MAGNESIUM SULFATE HEPTAHYDRATE 4 G/HR: 40 INJECTION, SOLUTION INTRAVENOUS at 19:41

## 2021-03-07 RX ADMIN — OXYTOCIN-SODIUM CHLORIDE 0.9% IV SOLN 30 UNIT/500ML 2 MILLI-UNITS/MIN: 30-0.9/5 SOLUTION at 00:02

## 2021-03-07 RX ADMIN — PENICILLIN G 3 MILLION UNITS: 3000000 INJECTION, SOLUTION INTRAVENOUS at 19:12

## 2021-03-07 RX ADMIN — OXYTOCIN-SODIUM CHLORIDE 0.9% IV SOLN 30 UNIT/500ML 2 MILLI-UNITS/MIN: 30-0.9/5 SOLUTION at 16:50

## 2021-03-07 RX ADMIN — LIDOCAINE HYDROCHLORIDE AND EPINEPHRINE 3 ML: 15; 5 INJECTION, SOLUTION EPIDURAL at 19:24

## 2021-03-07 RX ADMIN — SODIUM CHLORIDE, POTASSIUM CHLORIDE, SODIUM LACTATE AND CALCIUM CHLORIDE 125 ML/HR: 600; 310; 30; 20 INJECTION, SOLUTION INTRAVENOUS at 11:09

## 2021-03-07 RX ADMIN — SODIUM CHLORIDE, POTASSIUM CHLORIDE, SODIUM LACTATE AND CALCIUM CHLORIDE 125 ML/HR: 600; 310; 30; 20 INJECTION, SOLUTION INTRAVENOUS at 03:59

## 2021-03-07 RX ADMIN — FENTANYL CITRATE 250 MCG: 50 INJECTION INTRAMUSCULAR; INTRAVENOUS at 19:27

## 2021-03-07 RX ADMIN — SODIUM CHLORIDE 5 MILLION UNITS: 900 INJECTION INTRAVENOUS at 11:09

## 2021-03-07 RX ADMIN — OXYTOCIN-SODIUM CHLORIDE 0.9% IV SOLN 30 UNIT/500ML 2 MILLI-UNITS/MIN: 30-0.9/5 SOLUTION at 22:08

## 2021-03-07 RX ADMIN — SODIUM CHLORIDE, POTASSIUM CHLORIDE, SODIUM LACTATE AND CALCIUM CHLORIDE 999 ML/HR: 600; 310; 30; 20 INJECTION, SOLUTION INTRAVENOUS at 18:55

## 2021-03-07 NOTE — PROGRESS NOTES
Sharif Sams MD  [facility] [place] [facility]  [address]  [place] [address]  Office [phone]  Fax [phone]      [facility]  Sofia Caruso  : 1990  MRN: [id number]  CSN: [id number]    Labor progress note    Subjective   She reports back pain with contractions.  She denies headache.     Objective   Min/max vitals past 24 hours:  Temp  Min: 98 °F (36.7 °C)  Max: 98.4 °F (36.9 °C)   BP  Min: 115/57  Max: 159/106   Pulse  Min: 57  Max: 92   Resp  Min: 18  Max: 18        FHT's: reactive and category 1.  external monitors used   Cervix: was checked (by me): 1 cm / 60 % / -3   Contractions: regular every 3 minutes   Pitocin at 20      Assessment   1. IUP at 35w3d  2. Severe preeclampsia     Plan   1.   Pitocin discontinued and 18 Uzbek 30 cc transcervical Connell catheter able to be placed.  Catheter is taped on tension.  Once Connell bulb was out we will restart Pitocin closely followed by artificial rupture of membranes if possible.  Begin penicillin for GBS prophylaxis at this time.  Plan initiation of magnesium sulfate once membranes are ruptured.  Allow labor to continue pending maternal and fetal status  Plan discussed with family and questions answered.  Understanding verbalized.    Sharif Sams MD  3/7/2021  10:55 CST

## 2021-03-07 NOTE — PLAN OF CARE
Problem: Adult Inpatient Plan of Care  Goal: Plan of Care Review  Outcome: Ongoing, Progressing  Goal: Patient-Specific Goal (Individualized)  Outcome: Ongoing, Progressing  Goal: Absence of Hospital-Acquired Illness or Injury  Outcome: Ongoing, Progressing  Intervention: Identify and Manage Fall Risk  Recent Flowsheet Documentation  Taken 3/6/2021 0730 by Tahira Dubois RN  Safety Promotion/Fall Prevention: safety round/check completed  Intervention: Prevent Skin Injury  Recent Flowsheet Documentation  Taken 3/6/2021 0730 by Tahira Dubois RN  Body Position: supine  Goal: Optimal Comfort and Wellbeing  Outcome: Ongoing, Progressing  Intervention: Provide Person-Centered Care  Recent Flowsheet Documentation  Taken 3/6/2021 0730 by Tahira Dubois RN  Trust Relationship/Rapport: care explained  Goal: Readiness for Transition of Care  Outcome: Ongoing, Progressing   Goal Outcome Evaluation:

## 2021-03-07 NOTE — PLAN OF CARE
Goal Outcome Evaluation:  Plan of Care Reviewed With: patient, spouse, mother  Progress: improving  Outcome Summary: VSS; cervadil removed at 2005 on 3/6; pitocin started at 0000 3/7 low dose overnight, increased at 0600: SVE at 0607 1/50-60/-3

## 2021-03-07 NOTE — PLAN OF CARE
Problem: Adult Inpatient Plan of Care  Goal: Plan of Care Review  Outcome: Ongoing, Progressing  Flowsheets (Taken 3/7/2021 0718)  Progress: no change  Plan of Care Reviewed With: patient  Outcome Summary: pt currently has hodge bulb inserted per MD, ALICE, pitocin currently on 4mu/min.  Goal: Patient-Specific Goal (Individualized)  Outcome: Ongoing, Progressing  Goal: Absence of Hospital-Acquired Illness or Injury  Outcome: Ongoing, Progressing  Intervention: Identify and Manage Fall Risk  Recent Flowsheet Documentation  Taken 3/7/2021 0720 by Tahira Dubois RN  Safety Promotion/Fall Prevention: safety round/check completed  Intervention: Prevent Skin Injury  Recent Flowsheet Documentation  Taken 3/7/2021 0720 by Tahira Dubois RN  Body Position: side-lying, right  Goal: Optimal Comfort and Wellbeing  Outcome: Ongoing, Progressing  Intervention: Provide Person-Centered Care  Recent Flowsheet Documentation  Taken 3/7/2021 0720 by Tahira Dubois RN  Trust Relationship/Rapport: care explained  Goal: Readiness for Transition of Care  Outcome: Ongoing, Progressing

## 2021-03-07 NOTE — PROGRESS NOTES
Sharif Sams MD  [facility]  [address]  [place] [address]  Office [phone]  Fax [phone]      [facility]  Sofia Caruso  : 1990  MRN: [id number]  CSN: [id number]    Labor progress note    Subjective   She reports is feeling mildly painful contraction. No headache through the day today.     Objective   Min/max vitals past 24 hours:  Temp  Min: 98 °F (36.7 °C)  Max: 98.8 °F (37.1 °C)   BP  Min: 110/66  Max: 160/98   Pulse  Min: 59  Max: 92   Resp  Min: 18  Max: 18        FHT's: reactive and category 1.  external monitors used   Cervix: was checked (by me): 0.5 cm / 50 % / -3   Contractions:  DTR: irregular   1+      Assessment   1. IUP at 35w2d  2. Severe Pre-eclampsia     Plan   1.   Cervidil removed, unable to pass transcervical hodge catheter  Patient opts for low dose pitocin overnight instead of vaginal Cytotec  Begin Magnesium and PCN once making cervical change unless patient's condition changes  Allow labor to continue pending maternal and fetal status  Plan discussed with family and questions answered.  Understanding verbalized.    Sharif Sams MD  3/6/2021  20:31 CST

## 2021-03-08 ENCOUNTER — ANESTHESIA EVENT (OUTPATIENT)
Dept: PERIOP | Facility: HOSPITAL | Age: 31
End: 2021-03-08

## 2021-03-08 ENCOUNTER — ANESTHESIA (OUTPATIENT)
Dept: PERIOP | Facility: HOSPITAL | Age: 31
End: 2021-03-08

## 2021-03-08 LAB
ANION GAP SERPL CALCULATED.3IONS-SCNC: 10 MMOL/L (ref 5–15)
BASOPHILS # BLD AUTO: 0.03 10*3/MM3 (ref 0–0.2)
BASOPHILS NFR BLD AUTO: 0.2 % (ref 0–1.5)
BUN SERPL-MCNC: 7 MG/DL (ref 6–20)
BUN/CREAT SERPL: 7.1 (ref 7–25)
CALCIUM SPEC-SCNC: 7.3 MG/DL (ref 8.6–10.5)
CHLORIDE SERPL-SCNC: 102 MMOL/L (ref 98–107)
CO2 SERPL-SCNC: 22 MMOL/L (ref 22–29)
CREAT SERPL-MCNC: 0.99 MG/DL (ref 0.57–1)
DEPRECATED RDW RBC AUTO: 40.7 FL (ref 37–54)
EOSINOPHIL # BLD AUTO: 0.01 10*3/MM3 (ref 0–0.4)
EOSINOPHIL NFR BLD AUTO: 0.1 % (ref 0.3–6.2)
ERYTHROCYTE [DISTWIDTH] IN BLOOD BY AUTOMATED COUNT: 12.9 % (ref 12.3–15.4)
GFR SERPL CREATININE-BSD FRML MDRD: 67 ML/MIN/1.73
GLUCOSE SERPL-MCNC: 103 MG/DL (ref 65–99)
HCT VFR BLD AUTO: 28.6 % (ref 34–46.6)
HGB BLD-MCNC: 9.5 G/DL (ref 12–15.9)
IMM GRANULOCYTES # BLD AUTO: 0.06 10*3/MM3 (ref 0–0.05)
IMM GRANULOCYTES NFR BLD AUTO: 0.4 % (ref 0–0.5)
LYMPHOCYTES # BLD AUTO: 1.04 10*3/MM3 (ref 0.7–3.1)
LYMPHOCYTES NFR BLD AUTO: 7.3 % (ref 19.6–45.3)
MCH RBC QN AUTO: 28.9 PG (ref 26.6–33)
MCHC RBC AUTO-ENTMCNC: 33.2 G/DL (ref 31.5–35.7)
MCV RBC AUTO: 86.9 FL (ref 79–97)
MONOCYTES # BLD AUTO: 0.52 10*3/MM3 (ref 0.1–0.9)
MONOCYTES NFR BLD AUTO: 3.6 % (ref 5–12)
NEUTROPHILS NFR BLD AUTO: 12.64 10*3/MM3 (ref 1.7–7)
NEUTROPHILS NFR BLD AUTO: 88.4 % (ref 42.7–76)
NRBC BLD AUTO-RTO: 0 /100 WBC (ref 0–0.2)
PLATELET # BLD AUTO: 227 10*3/MM3 (ref 140–450)
PMV BLD AUTO: 11.6 FL (ref 6–12)
POTASSIUM SERPL-SCNC: 4.2 MMOL/L (ref 3.5–5.2)
RBC # BLD AUTO: 3.29 10*6/MM3 (ref 3.77–5.28)
SARS-COV-2 RNA PNL SPEC NAA+PROBE: NOT DETECTED
SODIUM SERPL-SCNC: 134 MMOL/L (ref 136–145)
WBC # BLD AUTO: 14.3 10*3/MM3 (ref 3.4–10.8)

## 2021-03-08 PROCEDURE — 85025 COMPLETE CBC W/AUTO DIFF WBC: CPT | Performed by: NURSE ANESTHETIST, CERTIFIED REGISTERED

## 2021-03-08 PROCEDURE — 87635 SARS-COV-2 COVID-19 AMP PRB: CPT | Performed by: OBSTETRICS & GYNECOLOGY

## 2021-03-08 PROCEDURE — 25010000002 METOCLOPRAMIDE PER 10 MG: Performed by: OBSTETRICS & GYNECOLOGY

## 2021-03-08 PROCEDURE — 25010000002 PROPOFOL 10 MG/ML EMULSION: Performed by: NURSE ANESTHETIST, CERTIFIED REGISTERED

## 2021-03-08 PROCEDURE — 0U9MXZZ DRAINAGE OF VULVA, EXTERNAL APPROACH: ICD-10-PCS | Performed by: OBSTETRICS & GYNECOLOGY

## 2021-03-08 PROCEDURE — 10140 I&D HMTMA SEROMA/FLUID COLLJ: CPT | Performed by: OBSTETRICS & GYNECOLOGY

## 2021-03-08 PROCEDURE — 25010000002 FENTANYL CITRATE (PF) 100 MCG/2ML SOLUTION: Performed by: NURSE ANESTHETIST, CERTIFIED REGISTERED

## 2021-03-08 PROCEDURE — 51702 INSERT TEMP BLADDER CATH: CPT

## 2021-03-08 PROCEDURE — 80048 BASIC METABOLIC PNL TOTAL CA: CPT | Performed by: NURSE ANESTHETIST, CERTIFIED REGISTERED

## 2021-03-08 PROCEDURE — 25010000003 MAGNESIUM SULFATE 20 GM/500ML SOLUTION: Performed by: OBSTETRICS & GYNECOLOGY

## 2021-03-08 PROCEDURE — 25010000002 BUTORPHANOL PER 1 MG: Performed by: OBSTETRICS & GYNECOLOGY

## 2021-03-08 PROCEDURE — 59400 OBSTETRICAL CARE: CPT | Performed by: OBSTETRICS & GYNECOLOGY

## 2021-03-08 PROCEDURE — 25010000002 CEFOXITIN PER 1 G: Performed by: NURSE ANESTHETIST, CERTIFIED REGISTERED

## 2021-03-08 PROCEDURE — 0KQM0ZZ REPAIR PERINEUM MUSCLE, OPEN APPROACH: ICD-10-PCS | Performed by: OBSTETRICS & GYNECOLOGY

## 2021-03-08 RX ORDER — ONDANSETRON 2 MG/ML
4 INJECTION INTRAMUSCULAR; INTRAVENOUS EVERY 6 HOURS PRN
Status: DISCONTINUED | OUTPATIENT
Start: 2021-03-08 | End: 2021-03-08 | Stop reason: HOSPADM

## 2021-03-08 RX ORDER — FENTANYL CITRATE 50 UG/ML
INJECTION, SOLUTION INTRAMUSCULAR; INTRAVENOUS AS NEEDED
Status: DISCONTINUED | OUTPATIENT
Start: 2021-03-08 | End: 2021-03-08 | Stop reason: SURG

## 2021-03-08 RX ORDER — PROPOFOL 10 MG/ML
VIAL (ML) INTRAVENOUS AS NEEDED
Status: DISCONTINUED | OUTPATIENT
Start: 2021-03-08 | End: 2021-03-08 | Stop reason: SURG

## 2021-03-08 RX ORDER — SODIUM CHLORIDE, SODIUM LACTATE, POTASSIUM CHLORIDE, CALCIUM CHLORIDE 600; 310; 30; 20 MG/100ML; MG/100ML; MG/100ML; MG/100ML
75 INJECTION, SOLUTION INTRAVENOUS CONTINUOUS
Status: DISCONTINUED | OUTPATIENT
Start: 2021-03-09 | End: 2021-03-09

## 2021-03-08 RX ORDER — CALCIUM GLUCONATE 94 MG/ML
1 INJECTION, SOLUTION INTRAVENOUS ONCE AS NEEDED
Status: DISCONTINUED | OUTPATIENT
Start: 2021-03-08 | End: 2021-03-09

## 2021-03-08 RX ORDER — OXYCODONE AND ACETAMINOPHEN 10; 325 MG/1; MG/1
1 TABLET ORAL ONCE AS NEEDED
Status: DISCONTINUED | OUTPATIENT
Start: 2021-03-08 | End: 2021-03-08 | Stop reason: HOSPADM

## 2021-03-08 RX ORDER — OXYTOCIN/0.9 % SODIUM CHLORIDE 30/500 ML
999 PLASTIC BAG, INJECTION (ML) INTRAVENOUS ONCE
Status: DISCONTINUED | OUTPATIENT
Start: 2021-03-08 | End: 2021-03-08 | Stop reason: HOSPADM

## 2021-03-08 RX ORDER — HYDROMORPHONE HYDROCHLORIDE 1 MG/ML
0.5 INJECTION, SOLUTION INTRAMUSCULAR; INTRAVENOUS; SUBCUTANEOUS
Status: DISCONTINUED | OUTPATIENT
Start: 2021-03-08 | End: 2021-03-08 | Stop reason: HOSPADM

## 2021-03-08 RX ORDER — MORPHINE SULFATE 2 MG/ML
1 INJECTION, SOLUTION INTRAMUSCULAR; INTRAVENOUS EVERY 4 HOURS PRN
Status: DISCONTINUED | OUTPATIENT
Start: 2021-03-08 | End: 2021-03-11 | Stop reason: HOSPADM

## 2021-03-08 RX ORDER — KETAMINE HYDROCHLORIDE 50 MG/ML
INJECTION, SOLUTION, CONCENTRATE INTRAMUSCULAR; INTRAVENOUS AS NEEDED
Status: DISCONTINUED | OUTPATIENT
Start: 2021-03-08 | End: 2021-03-08 | Stop reason: SURG

## 2021-03-08 RX ORDER — FENTANYL CITRATE 50 UG/ML
25 INJECTION, SOLUTION INTRAMUSCULAR; INTRAVENOUS
Status: DISCONTINUED | OUTPATIENT
Start: 2021-03-08 | End: 2021-03-08 | Stop reason: HOSPADM

## 2021-03-08 RX ORDER — ONDANSETRON 2 MG/ML
4 INJECTION INTRAMUSCULAR; INTRAVENOUS EVERY 6 HOURS PRN
Status: DISCONTINUED | OUTPATIENT
Start: 2021-03-08 | End: 2021-03-11 | Stop reason: HOSPADM

## 2021-03-08 RX ORDER — NALOXONE HCL 0.4 MG/ML
0.04 VIAL (ML) INJECTION AS NEEDED
Status: DISCONTINUED | OUTPATIENT
Start: 2021-03-08 | End: 2021-03-08 | Stop reason: HOSPADM

## 2021-03-08 RX ORDER — CALCIUM CARBONATE 200(500)MG
2 TABLET,CHEWABLE ORAL 3 TIMES DAILY PRN
Status: DISCONTINUED | OUTPATIENT
Start: 2021-03-08 | End: 2021-03-11 | Stop reason: HOSPADM

## 2021-03-08 RX ORDER — IBUPROFEN 600 MG/1
600 TABLET ORAL ONCE AS NEEDED
Status: DISCONTINUED | OUTPATIENT
Start: 2021-03-08 | End: 2021-03-08 | Stop reason: HOSPADM

## 2021-03-08 RX ORDER — BISACODYL 10 MG
10 SUPPOSITORY, RECTAL RECTAL DAILY PRN
Status: DISCONTINUED | OUTPATIENT
Start: 2021-03-09 | End: 2021-03-11 | Stop reason: HOSPADM

## 2021-03-08 RX ORDER — DOCUSATE SODIUM 100 MG/1
100 CAPSULE, LIQUID FILLED ORAL 2 TIMES DAILY
Status: DISCONTINUED | OUTPATIENT
Start: 2021-03-08 | End: 2021-03-11 | Stop reason: HOSPADM

## 2021-03-08 RX ORDER — NALOXONE HCL 0.4 MG/ML
0.4 VIAL (ML) INJECTION
Status: DISCONTINUED | OUTPATIENT
Start: 2021-03-08 | End: 2021-03-11 | Stop reason: HOSPADM

## 2021-03-08 RX ORDER — HYDROXYZINE HYDROCHLORIDE 25 MG/1
50 TABLET, FILM COATED ORAL NIGHTLY PRN
Status: DISCONTINUED | OUTPATIENT
Start: 2021-03-08 | End: 2021-03-11 | Stop reason: HOSPADM

## 2021-03-08 RX ORDER — FLUMAZENIL 0.1 MG/ML
0.2 INJECTION INTRAVENOUS AS NEEDED
Status: DISCONTINUED | OUTPATIENT
Start: 2021-03-08 | End: 2021-03-08 | Stop reason: HOSPADM

## 2021-03-08 RX ORDER — METHYLERGONOVINE MALEATE 0.2 MG/ML
200 INJECTION INTRAVENOUS ONCE AS NEEDED
Status: DISCONTINUED | OUTPATIENT
Start: 2021-03-08 | End: 2021-03-09

## 2021-03-08 RX ORDER — ONDANSETRON 2 MG/ML
4 INJECTION INTRAMUSCULAR; INTRAVENOUS AS NEEDED
Status: DISCONTINUED | OUTPATIENT
Start: 2021-03-08 | End: 2021-03-08 | Stop reason: HOSPADM

## 2021-03-08 RX ORDER — OXYTOCIN/0.9 % SODIUM CHLORIDE 30/500 ML
125 PLASTIC BAG, INJECTION (ML) INTRAVENOUS CONTINUOUS PRN
Status: DISCONTINUED | OUTPATIENT
Start: 2021-03-08 | End: 2021-03-08 | Stop reason: HOSPADM

## 2021-03-08 RX ORDER — PROMETHAZINE HYDROCHLORIDE 25 MG/1
25 TABLET ORAL EVERY 6 HOURS PRN
Status: DISCONTINUED | OUTPATIENT
Start: 2021-03-08 | End: 2021-03-11 | Stop reason: HOSPADM

## 2021-03-08 RX ORDER — OXYTOCIN/0.9 % SODIUM CHLORIDE 30/500 ML
250 PLASTIC BAG, INJECTION (ML) INTRAVENOUS CONTINUOUS
Status: ACTIVE | OUTPATIENT
Start: 2021-03-08 | End: 2021-03-08

## 2021-03-08 RX ORDER — CALCIUM GLUCONATE 94 MG/ML
1 INJECTION, SOLUTION INTRAVENOUS ONCE
Status: DISCONTINUED | OUTPATIENT
Start: 2021-03-08 | End: 2021-03-08

## 2021-03-08 RX ORDER — LIDOCAINE HYDROCHLORIDE 20 MG/ML
10 INJECTION, SOLUTION INFILTRATION; PERINEURAL ONCE
Status: DISCONTINUED | OUTPATIENT
Start: 2021-03-08 | End: 2021-03-08

## 2021-03-08 RX ORDER — BUTORPHANOL TARTRATE 1 MG/ML
1 INJECTION, SOLUTION INTRAMUSCULAR; INTRAVENOUS
Status: DISCONTINUED | OUTPATIENT
Start: 2021-03-08 | End: 2021-03-08 | Stop reason: HOSPADM

## 2021-03-08 RX ORDER — IBUPROFEN 600 MG/1
600 TABLET ORAL EVERY 8 HOURS PRN
Status: DISCONTINUED | OUTPATIENT
Start: 2021-03-08 | End: 2021-03-11 | Stop reason: HOSPADM

## 2021-03-08 RX ORDER — LIDOCAINE HYDROCHLORIDE 20 MG/ML
20 INJECTION, SOLUTION EPIDURAL; INFILTRATION; INTRACAUDAL; PERINEURAL ONCE
Status: DISCONTINUED | OUTPATIENT
Start: 2021-03-08 | End: 2021-03-08

## 2021-03-08 RX ORDER — LABETALOL HYDROCHLORIDE 5 MG/ML
20 INJECTION, SOLUTION INTRAVENOUS ONCE
Status: COMPLETED | OUTPATIENT
Start: 2021-03-08 | End: 2021-03-08

## 2021-03-08 RX ORDER — PRENATAL VIT/IRON FUM/FOLIC AC 27MG-0.8MG
1 TABLET ORAL DAILY
Status: DISCONTINUED | OUTPATIENT
Start: 2021-03-08 | End: 2021-03-11 | Stop reason: HOSPADM

## 2021-03-08 RX ORDER — ONDANSETRON 4 MG/1
4 TABLET, FILM COATED ORAL EVERY 6 HOURS PRN
Status: DISCONTINUED | OUTPATIENT
Start: 2021-03-08 | End: 2021-03-08 | Stop reason: HOSPADM

## 2021-03-08 RX ORDER — MISOPROSTOL 200 UG/1
800 TABLET ORAL AS NEEDED
Status: DISCONTINUED | OUTPATIENT
Start: 2021-03-08 | End: 2021-03-08 | Stop reason: HOSPADM

## 2021-03-08 RX ORDER — IBUPROFEN 800 MG/1
800 TABLET ORAL EVERY 8 HOURS PRN
Status: DISCONTINUED | OUTPATIENT
Start: 2021-03-08 | End: 2021-03-08 | Stop reason: HOSPADM

## 2021-03-08 RX ORDER — METOCLOPRAMIDE HYDROCHLORIDE 5 MG/ML
10 INJECTION INTRAMUSCULAR; INTRAVENOUS ONCE
Status: COMPLETED | OUTPATIENT
Start: 2021-03-08 | End: 2021-03-08

## 2021-03-08 RX ORDER — CEFOXITIN 1 G/1
INJECTION, POWDER, FOR SOLUTION INTRAVENOUS AS NEEDED
Status: DISCONTINUED | OUTPATIENT
Start: 2021-03-08 | End: 2021-03-08 | Stop reason: SURG

## 2021-03-08 RX ORDER — LABETALOL HYDROCHLORIDE 5 MG/ML
5 INJECTION, SOLUTION INTRAVENOUS
Status: DISCONTINUED | OUTPATIENT
Start: 2021-03-08 | End: 2021-03-08 | Stop reason: HOSPADM

## 2021-03-08 RX ORDER — ONDANSETRON 4 MG/1
4 TABLET, FILM COATED ORAL EVERY 8 HOURS PRN
Status: DISCONTINUED | OUTPATIENT
Start: 2021-03-08 | End: 2021-03-11 | Stop reason: HOSPADM

## 2021-03-08 RX ORDER — CARBOPROST TROMETHAMINE 250 UG/ML
250 INJECTION, SOLUTION INTRAMUSCULAR AS NEEDED
Status: DISCONTINUED | OUTPATIENT
Start: 2021-03-08 | End: 2021-03-08 | Stop reason: HOSPADM

## 2021-03-08 RX ORDER — HYDROCODONE BITARTRATE AND ACETAMINOPHEN 5; 325 MG/1; MG/1
1 TABLET ORAL EVERY 4 HOURS PRN
Status: DISCONTINUED | OUTPATIENT
Start: 2021-03-08 | End: 2021-03-11 | Stop reason: HOSPADM

## 2021-03-08 RX ORDER — SODIUM CHLORIDE 0.9 % (FLUSH) 0.9 %
1-10 SYRINGE (ML) INJECTION AS NEEDED
Status: DISCONTINUED | OUTPATIENT
Start: 2021-03-08 | End: 2021-03-11 | Stop reason: HOSPADM

## 2021-03-08 RX ORDER — MAGNESIUM HYDROXIDE 1200 MG/15ML
LIQUID ORAL AS NEEDED
Status: DISCONTINUED | OUTPATIENT
Start: 2021-03-08 | End: 2021-03-08 | Stop reason: HOSPADM

## 2021-03-08 RX ORDER — MISOPROSTOL 200 UG/1
600 TABLET ORAL ONCE AS NEEDED
Status: DISCONTINUED | OUTPATIENT
Start: 2021-03-08 | End: 2021-03-11 | Stop reason: HOSPADM

## 2021-03-08 RX ORDER — HYDROCORTISONE 25 MG/G
1 CREAM TOPICAL AS NEEDED
Status: DISCONTINUED | OUTPATIENT
Start: 2021-03-08 | End: 2021-03-11 | Stop reason: HOSPADM

## 2021-03-08 RX ORDER — FAMOTIDINE 10 MG/ML
20 INJECTION, SOLUTION INTRAVENOUS ONCE AS NEEDED
Status: DISCONTINUED | OUTPATIENT
Start: 2021-03-08 | End: 2021-03-08 | Stop reason: HOSPADM

## 2021-03-08 RX ORDER — LIDOCAINE HYDROCHLORIDE 20 MG/ML
INJECTION, SOLUTION EPIDURAL; INFILTRATION; INTRACAUDAL; PERINEURAL AS NEEDED
Status: DISCONTINUED | OUTPATIENT
Start: 2021-03-08 | End: 2021-03-08 | Stop reason: SURG

## 2021-03-08 RX ORDER — PROMETHAZINE HYDROCHLORIDE 12.5 MG/1
12.5 SUPPOSITORY RECTAL EVERY 6 HOURS PRN
Status: DISCONTINUED | OUTPATIENT
Start: 2021-03-08 | End: 2021-03-11 | Stop reason: HOSPADM

## 2021-03-08 RX ADMIN — LIDOCAINE HYDROCHLORIDE 50 MG: 20 INJECTION, SOLUTION EPIDURAL; INFILTRATION; INTRACAUDAL; PERINEURAL at 06:10

## 2021-03-08 RX ADMIN — CEFOXITIN SODIUM 2 G: 1 POWDER, FOR SOLUTION INTRAVENOUS at 06:13

## 2021-03-08 RX ADMIN — SODIUM CHLORIDE, POTASSIUM CHLORIDE, SODIUM LACTATE AND CALCIUM CHLORIDE 75 ML/HR: 600; 310; 30; 20 INJECTION, SOLUTION INTRAVENOUS at 08:37

## 2021-03-08 RX ADMIN — DOCUSATE SODIUM 100 MG: 100 CAPSULE ORAL at 21:28

## 2021-03-08 RX ADMIN — HYDROCODONE BITARTRATE AND ACETAMINOPHEN 1 TABLET: 5; 325 TABLET ORAL at 21:28

## 2021-03-08 RX ADMIN — SODIUM CHLORIDE, POTASSIUM CHLORIDE, SODIUM LACTATE AND CALCIUM CHLORIDE 75 ML/HR: 600; 310; 30; 20 INJECTION, SOLUTION INTRAVENOUS at 23:17

## 2021-03-08 RX ADMIN — MAGNESIUM SULFATE HEPTAHYDRATE 2 G/HR: 40 INJECTION, SOLUTION INTRAVENOUS at 04:09

## 2021-03-08 RX ADMIN — METOCLOPRAMIDE 10 MG: 5 INJECTION, SOLUTION INTRAMUSCULAR; INTRAVENOUS at 00:21

## 2021-03-08 RX ADMIN — DOCUSATE SODIUM 100 MG: 100 CAPSULE ORAL at 10:31

## 2021-03-08 RX ADMIN — HYDROCODONE BITARTRATE AND ACETAMINOPHEN 1 TABLET: 5; 325 TABLET ORAL at 17:29

## 2021-03-08 RX ADMIN — FENTANYL CITRATE 100 MCG: 50 INJECTION, SOLUTION INTRAMUSCULAR; INTRAVENOUS at 06:10

## 2021-03-08 RX ADMIN — IBUPROFEN 600 MG: 600 TABLET ORAL at 10:29

## 2021-03-08 RX ADMIN — LABETALOL HYDROCHLORIDE 20 MG: 5 INJECTION, SOLUTION INTRAVENOUS at 03:20

## 2021-03-08 RX ADMIN — HYDROCODONE BITARTRATE AND ACETAMINOPHEN 1 TABLET: 5; 325 TABLET ORAL at 03:51

## 2021-03-08 RX ADMIN — IBUPROFEN 800 MG: 800 TABLET, FILM COATED ORAL at 03:47

## 2021-03-08 RX ADMIN — MAGNESIUM SULFATE HEPTAHYDRATE 2 G/HR: 40 INJECTION, SOLUTION INTRAVENOUS at 15:43

## 2021-03-08 RX ADMIN — PROPOFOL 450 MG: 10 INJECTION, EMULSION INTRAVENOUS at 06:10

## 2021-03-08 RX ADMIN — IBUPROFEN 600 MG: 600 TABLET ORAL at 19:25

## 2021-03-08 RX ADMIN — BUTORPHANOL TARTRATE 2 MG: 1 INJECTION, SOLUTION INTRAMUSCULAR; INTRAVENOUS at 05:30

## 2021-03-08 RX ADMIN — KETAMINE HYDROCHLORIDE 50 MG: 50 INJECTION, SOLUTION INTRAMUSCULAR; INTRAVENOUS at 06:10

## 2021-03-08 RX ADMIN — BENZOCAINE AND LEVOMENTHOL: 200; 5 SPRAY TOPICAL at 17:35

## 2021-03-08 NOTE — ANESTHESIA PROCEDURE NOTES
CSE Block    Pre-sedation assessment completed: 3/7/2021 7:13 PM    Patient reassessed immediately prior to procedure    Patient location during procedure: OB  Start time: 3/7/2021 7:13 PM  End time: 3/7/2021 7:27 PM  Reason for block: procedure for pain  Staffing  Resident/CRNA: Edward Rojo CRNA  Preanesthetic Checklist  Completed: patient identified, IV checked, site marked, risks and benefits discussed, surgical consent, monitors and equipment checked, pre-op evaluation and timeout performed  CSE  Patient position: sitting  Prep: ChloraPrep  Patient monitoring: blood pressure monitoring, continuous pulse oximetry and EKG  Procedures: landmark technique and palpation technique  Spinal Needle  Needle type: Pencan   Needle gauge: 25 G  Approach: midline  Location: L4-5  Fluid Appearance: clear    Epidural Needle  Injection technique: KEREN air  Needle type: Tuohy   Needle gauge: 18 G  Loss of Resistance: 8cm  Cath Depth at Skin (cm): 14  Aspiration: negative  Test dose: negative      Catheter  Catheter type: end hole  Catheter size: 20 G  Assessment  Dressing:occlusive dressing applied and secured with tape  Pt Tolerance:patient tolerated the procedure well with no apparent complications  Complications:no  Additional Notes  KEREN tech, negative for heme, csf or paresthesias.  Spinal needle passed with positive csf

## 2021-03-08 NOTE — ANESTHESIA PREPROCEDURE EVALUATION
Anesthesia Evaluation     Patient summary reviewed and Nursing notes reviewed   NPO Solid Status: > 8 hours  NPO Liquid Status: > 4 hours           Airway   Mallampati: II  No difficulty expected  Dental - normal exam     Pulmonary - negative pulmonary ROS   (-) COPD, asthma, sleep apnea, not a smoker  Cardiovascular - negative cardio ROS    (-) valvular problems/murmurs, dysrhythmias, angina      Neuro/Psych  (+) headaches,     (-) seizures, CVA  GI/Hepatic/Renal/Endo - negative ROS   (-) liver disease, no renal disease, diabetes, no thyroid disorder    Musculoskeletal (-) negative ROS    Abdominal    Substance History - negative use     OB/GYN    (+) Pregnant, Preeclampsia, pregnancy induced hypertension        Other        ROS/Med Hx Other: Large labia hematoma needing emergent evac                    Anesthesia Plan    ASA 3 - emergent     MAC       Anesthetic plan, all risks, benefits, and alternatives have been provided, discussed and informed consent has been obtained with: patient.

## 2021-03-08 NOTE — L&D DELIVERY NOTE
[facility]  Vaginal Delivery Note    Delivery     Delivery: Vaginal, Spontaneous     YOB: 2019    Time of Birth:  Gestational Age 1:56 AM   35w4d     Anesthesia: Epidural     Delivering clinician: Sharif Sams    Forceps?   No   Vacuum? No    Shoulder dystocia present: No        Delivery narrative:  Progressed to C/C/+2 and pushed well to deliver a LVIM. AMY to LOT vigorous to mom's abdomen. NICU present for delivery. Placenta spontaneous and intact with 3VC after IV Pitocin. 2nd degree perineal laceration repaired with 2-0 Vicryl Rapide. Epidural anes. EBL 250mL. No complications. Sponge and needle count correct.     Infant    Findings: male  infant     Infant observations: Weight: 2390 g (5 lb 4.3 oz)   Length: 18  in  Observations/Comments:  aga      Apgars: 8  @ 1 minute /    8  @ 5 minutes   Infant Name:      Placenta, Cord, and Fluid    Placenta delivered  Spontaneous  at   3/8/2021  2:00 AM     Cord: 3 vessels  present.   Nuchal Cord?  no   Cord blood obtained: No    Cord gases obtained:  No    Cord gas results: Venous:  No results found for: PHCVEN    Arterial:  No results found for: PHCART     Repair    Episiotomy: None     No    Lacerations: Yes  Laceration Information  Laceration Repaired?   Perineal: 2nd  Yes    Periurethral:       Labial:       Sulcus:       Vaginal:       Cervical:         Suture used for repair: 2-0 Vicryl Rapide   Estimated Blood Loss:  250 mL           Complications  hypertension    Disposition  Mother to Mother Baby/Postpartum  in stable condition currently.  Baby to remains with mom  in stable condition currently.      Sharif Sams MD  03/08/21  02:18 CST

## 2021-03-08 NOTE — PROGRESS NOTES
Sharif Sams MD  [facility]  [address]  [place] [address]  Office [phone]  Fax [phone]      [facility]  Sofia Caruso  : 1990  MRN: [id number]  CSN: [id number]    Labor progress note    Subjective   She reports is feeling painful contraction, no headache     Objective   Min/max vitals past 24 hours:  Temp  Min: 98.2 °F (36.8 °C)  Max: 98.7 °F (37.1 °C)   BP  Min: 115/57  Max: 171/84   Pulse  Min: 53  Max: 83   Resp  Min: 18  Max: 18        FHT's: reactive and category 1.  external monitors used   Cervix: was checked (by me): 3 cm / 50 % / -3   Connell bulb out   Contractions: irregular   Pitocin at 6      Assessment   1. IUP at 35w3d  2. Severe Pre-eclampsia     Plan   1.   AROM - clear fluid seen  OK for epidural  Begin Magnesium infusion for seizure prophylaxis  Allow labor to continue pending maternal and fetal status  Plan discussed with family and questions answered.  Understanding verbalized.    Sharif Sams MD  3/7/2021  18:40 CST

## 2021-03-08 NOTE — OP NOTE
[facility]  Sofia Caruso  : 1990  MRN: [id number]  [facility]: [id number]  Date: 3/8/2021    Operative Note    VULVA BIOPSY      Pre-op Diagnosis:  hematoma of right labia, postpartum   Post-op Diagnosis:   Same   Procedure: Procedure(s):  incision and drainage of labia hematoma   Surgeon: Surgeon(s):  Sharif Sams MD Tolar, Stewart B, MD       Anesthesia:  Monitored anesthesia care     Estimated Blood Loss: 600   mls   Fluids: 1000   mls   UOP: 300   mls   Drains:  Connell catheter   ABx:  Cefoxitin     Specimens:   None   Findings:  Massively enlarged right labia with underlying hematoma.  This extended anteriorly to the mons pubis and posteriorly towards the buttocks lateral to the anus.  It did not cross midline.  There was no arterial bleed identified, but there were multiple areas of oozing.   Complications:  None     INDICATION: Sofia Caruso is a 30 y.o. female with recent vaginal delivery where she sustained a second-degree perineal laceration.  This was repaired and hemostatic, but a couple hours later she developed rapidly worsening pressure and perineal pain.  For my initial evaluation to exam under anesthesia in the operating room, the hematoma had roughly doubled in size.  PROCEDURE: After informed consent was obtained, the patient was taken to the operating room where monitored anesthesia was administered. She was [facility] in the [facility] and her perineum was prepped and draped in normal sterile fashion.    A vertical incision was made along the base of the right labia majora, approximately 8 cm in length, to allow access to the hematoma.  The clots were completely evacuated and the areas of tracking identified as summarized above.  There was no arterial bleeding identified.  Several areas of oozing along the medial and lateral borders underlying the right vaginal sidewall were made hemostatic with figure-of-eight sutures of 0 Vicryl.  At this point there was  no blood welling up within the wound and hemostasis was adequate.  The wound cavity was then closed in layers with multiple interrupted sutures of 0 Vicryl.  The subcutaneous tissues were then closed with a running suture of 2-0 Vicryl.  The skin was then closed with a subcuticular stitch of 3-0 Vicryl Rapide.  At the conclusion of the case there was still some edema at the anterior right labia majora, but the remainder of the edema had almost completely resolved.  The area was observed for several minutes and no active bleeding, or expansion of swelling was noted.   The patient was then awakened by anesthesia and taken to the recovery room in stable condition. She tolerated the procedure well without complications. All sponge, needle and instrument counts were correct times 3 per the operating room staff.     Dr Salcido was present as an assistant for the case and responsible for performing the following critical activities: Retraction and Suction and their skilled assistance was necessary for the success of this case.  Without his presence, visualization would have been much more difficult and the procedure certainly would have taken longer.  His knowledge and experience was also vital to the success of the procedure.      Sharif Sams MD   3/8/2021  08:39 CST

## 2021-03-08 NOTE — ANESTHESIA PREPROCEDURE EVALUATION
Anesthesia Evaluation     Patient summary reviewed and Nursing notes reviewed   NPO Solid Status: > 8 hours  NPO Liquid Status: > 8 hours           Airway   Mallampati: II  No difficulty expected  Dental - normal exam     Pulmonary - negative pulmonary ROS   (-) COPD, asthma, sleep apnea, not a smoker  Cardiovascular - negative cardio ROS    (-) valvular problems/murmurs, dysrhythmias, angina      Neuro/Psych  (+) headaches,     (-) seizures, CVA  GI/Hepatic/Renal/Endo - negative ROS   (-) liver disease, no renal disease, diabetes, no thyroid disorder    Musculoskeletal (-) negative ROS    Abdominal    Substance History - negative use     OB/GYN    (+) Pregnant, Preeclampsia, pregnancy induced hypertension        Other                        Anesthesia Plan    ASA 3     epidural       Anesthetic plan, all risks, benefits, and alternatives have been provided, discussed and informed consent has been obtained with: patient.

## 2021-03-08 NOTE — PROGRESS NOTES
Scheduled 3/22/21 Subjective   Sofia Caruso is a 26 y.o. female is here today as a self referral.    HPI Comments: I'm here for a pap and physical and need a refill of my oc's. I also want a rash on groin area evaluated.    Gynecologic Exam   The patient's pertinent negatives include no pelvic pain or vaginal discharge. Associated symptoms include rash (in the groin area). Pertinent negatives include no abdominal pain, back pain, chills, constipation, diarrhea, flank pain, headaches, nausea, sore throat or vomiting.       The following portions of the patient's history were reviewed and updated as appropriate: allergies, current medications, past family history, past social history, past surgical history and problem list.    Review of Systems   Constitutional: Negative for activity change, appetite change, chills and fatigue.   HENT: Negative for congestion, dental problem, ear pain, hearing loss, nosebleeds, rhinorrhea, sneezing, sore throat and voice change.    Eyes: Negative for discharge, redness, itching and visual disturbance.   Respiratory: Negative for apnea, cough, choking, shortness of breath and wheezing.    Cardiovascular: Negative for chest pain and palpitations.   Gastrointestinal: Negative for abdominal distention, abdominal pain, constipation, diarrhea, nausea and vomiting.   Endocrine: Negative for cold intolerance, heat intolerance and polyuria.   Genitourinary: Negative for difficulty urinating, dyspareunia, flank pain, genital sores, menstrual problem, pelvic pain, vaginal bleeding and vaginal discharge.   Musculoskeletal: Negative for arthralgias, back pain, myalgias and neck pain.   Skin: Positive for rash (in the groin area). Negative for pallor.   Allergic/Immunologic: Negative for environmental allergies and food allergies.   Neurological: Negative for dizziness, tremors, seizures, numbness and headaches.   Hematological: Negative for adenopathy. Does not bruise/bleed easily.   Psychiatric/Behavioral:  Negative for agitation, decreased concentration, sleep disturbance and suicidal ideas. The patient is not nervous/anxious.        Objective   Physical Exam   Constitutional: She is oriented to person, place, and time. She appears well-developed and well-nourished. No distress.   HENT:   Head: Normocephalic and atraumatic.   Right Ear: External ear normal.   Left Ear: External ear normal.   Nose: Nose normal.   Mouth/Throat: Oropharynx is clear and moist.   Eyes: EOM are normal. Pupils are equal, round, and reactive to light.   Neck: Normal range of motion. No thyromegaly present.   Cardiovascular: Normal rate, regular rhythm and normal heart sounds.    No murmur heard.  Pulmonary/Chest: Effort normal and breath sounds normal. No respiratory distress. She has no wheezes. Right breast exhibits no inverted nipple and no mass. Left breast exhibits no inverted nipple and no mass.   Abdominal: Soft. Bowel sounds are normal. There is no tenderness.   Genitourinary: Vagina normal and uterus normal. No breast tenderness. Pelvic exam was performed with patient supine. There is no rash or tenderness on the right labia. There is no rash or tenderness on the left labia. Cervix exhibits no motion tenderness. Right adnexum displays no mass and no tenderness. Left adnexum displays no mass and no tenderness. No bleeding in the vagina. No vaginal discharge found.   Genitourinary Comments: Urethra and urethral meatus normal  Bladder normal no prolapse   Rectum examined and intact without lesions  Perineum and mons area have a mixture of folliculitis and molluscum     Musculoskeletal: Normal range of motion.   Lymphadenopathy:        Right: No inguinal adenopathy present.        Left: No inguinal adenopathy present.   Neurological: She is alert and oriented to person, place, and time. She has normal reflexes.   Skin: Skin is warm and dry.   Psychiatric: She has a normal mood and affect. Her behavior is normal. Judgment and thought  content normal.   Nursing note and vitals reviewed.        Assessment/Plan   Sofia was seen today for gynecologic exam.    Diagnoses and all orders for this visit:    Visit for gynecologic examination     Normal gyn exam. Pt has [facility] as her PCP and she does her labs and other Rx's.  -     Liquid-based Pap Smear, Screening    Encounter for surveillance of contraceptive pills  -     norgestimate-ethinyl estradiol (TRI-SPRINTEC) 0.18/0.215/0.25 MG-35 MCG per tablet; Take 1 tablet by mouth Daily.    BMI 33.0-33.9,adult    Pt. states she is going to start exercising when it gets warmer. She is trying to watch her carb intake      Molluscum contagiosum     Her groin rash is in the perineum and mons area and is a mixture of molluscum and folliculitis.

## 2021-03-08 NOTE — ANESTHESIA POSTPROCEDURE EVALUATION
"Patient: Sofia Caruso    Procedure Summary     Date: 03/08/21 Room / Location: [facility] OR [address] [place] [address] PAD OR    Anesthesia Start: 0608 Anesthesia Stop: 0722    Procedure: incision and drainage of labia hematoma (N/A Vulva) Diagnosis: (hematoma of labia)    Surgeons: Sharif Sams MD Provider: Cuong Bailey CRNA    Anesthesia Type: MAC ASA Status: 3 - Emergent          Anesthesia Type: MAC    Vitals  Vitals Value Taken Time   /91 03/08/21 0803   Temp 98.6 °F (37 °C) 03/08/21 0800   Pulse 87 03/08/21 0804   Resp 16 03/08/21 0800   SpO2 98 % 03/08/21 0804   Vitals shown include unvalidated device data.        Post Anesthesia Care and Evaluation    Patient location during evaluation: PACU  Patient participation: complete - patient participated  Level of consciousness: awake and alert  Pain management: adequate  Airway patency: patent  Anesthetic complications: No anesthetic complications    Cardiovascular status: acceptable  Respiratory status: acceptable  Hydration status: acceptable    Comments: Blood pressure 135/90, pulse 87, temperature 98.6 °F (37 °C), temperature source Temporal, resp. rate 16, height 175.3 cm (69\"), weight 109 kg (240 lb), SpO2 98 %, currently breastfeeding.    Pt discharged from PACU based on favian score >8      "

## 2021-03-08 NOTE — LACTATION NOTE
"Infant:  Lucio  :  3[birth year]  35 wks gestation    Assisted with bfing in L&D after surgery. Mother drowsy, but can follow instructions and participate in bfing. Explained need for skin to skin bfing every time and benefits of same. Unswaddled infant and placed him prone across mother's chest. He immediately began gaping, rooting with little stimulation needed. With neck control provided, and breast shaping, infant easily took large part of breast, most of areola, and began suckling. Some slight cheek dimpling noted with each suck. Mild stimulation needed to keep infant engaged and feeding. (No Linwood reflex stim required.) Taught mother how to break seal to disengage latch. She performed same well after demo. Infant nursed approx 30 mins total. Pt's mother recorded same on bfing log. RN notified.     Education: Bfing: A great Start book given, with emphasis on infant abdomen size, and feeding frequency and amounts first few days. Emphasized need to discern subtle feeding cues and feeding right away; even if it seems like a short time since last feed. (Examples given) Stressed not to allow infant more than a 3 hour interval between feeds. Instructed to always feed with infant in diaper and hat, blanket covering back, skin to skin with mother. Described proper latch, \"tugging sensation, but no pain,\" and to relatch anytime she feels pain or pinching. Steps for deep latched explained and demo'ed during feed. Explained effects of oxytocin and what to expect. Suggested hand expressing colostrum drops for nipple care and feeding to infant between feeds. Also advised against giving formula unless MD orders that it is medially necessary. Noted that if extra feeds are required, pumping, hand expressing colostrum is best way to do this. Next feeding time, Lactation staff contact number written on white board. Pt, her mother, and FOB (Geoff) voiced understanding of all instructions, as well as appreciation of help.  "

## 2021-03-08 NOTE — PROGRESS NOTES
Called back by nursing for right labial swelling.  For the past hour the patient has been having pain near her coccyx and around her vulva.  On evaluation there is a large right labial hematoma that tracks back towards the gluteus and up towards the mons.  It does not cross midline but the skin is very tense.  There is thin lochia present but no significant vaginal bleeding.  It does appear to be slowly expanding.  Given the size of the hematoma, I feel it is most appropriate to proceed to the operating room for incision and drainage.  We will try to identify and correct any active bleeding.  I discussed my recommendations with the patient and her family.  We discussed surgical risks of bleeding and infection.  She desires to proceed.  Preoperative orders have been placed and our staff have been notified.

## 2021-03-09 LAB
HCT VFR BLD AUTO: 24.5 % (ref 34–46.6)
HGB BLD-MCNC: 8.2 G/DL (ref 12–15.9)

## 2021-03-09 PROCEDURE — 25010000003 MAGNESIUM SULFATE 20 GM/500ML SOLUTION: Performed by: OBSTETRICS & GYNECOLOGY

## 2021-03-09 PROCEDURE — 85018 HEMOGLOBIN: CPT | Performed by: OBSTETRICS & GYNECOLOGY

## 2021-03-09 PROCEDURE — 85014 HEMATOCRIT: CPT | Performed by: OBSTETRICS & GYNECOLOGY

## 2021-03-09 RX ORDER — NIFEDIPINE 30 MG/1
60 TABLET, EXTENDED RELEASE ORAL ONCE
Status: COMPLETED | OUTPATIENT
Start: 2021-03-09 | End: 2021-03-09

## 2021-03-09 RX ORDER — ALPRAZOLAM 0.5 MG/1
0.5 TABLET ORAL NIGHTLY PRN
Status: COMPLETED | OUTPATIENT
Start: 2021-03-09 | End: 2021-03-09

## 2021-03-09 RX ADMIN — NIFEDIPINE 60 MG: 30 TABLET, FILM COATED, EXTENDED RELEASE ORAL at 18:22

## 2021-03-09 RX ADMIN — SODIUM CHLORIDE, PRESERVATIVE FREE 4 ML: 5 INJECTION INTRAVENOUS at 08:54

## 2021-03-09 RX ADMIN — DOCUSATE SODIUM 100 MG: 100 CAPSULE ORAL at 08:53

## 2021-03-09 RX ADMIN — SERTRALINE HYDROCHLORIDE 25 MG: 50 TABLET, FILM COATED ORAL at 20:35

## 2021-03-09 RX ADMIN — PRENATAL VIT W/ FE FUMARATE-FA TAB 27-0.8 MG 1 TABLET: 27-0.8 TAB at 08:53

## 2021-03-09 RX ADMIN — HYDROCODONE BITARTRATE AND ACETAMINOPHEN 1 TABLET: 5; 325 TABLET ORAL at 01:47

## 2021-03-09 RX ADMIN — ALPRAZOLAM 0.5 MG: 0.5 TABLET ORAL at 20:34

## 2021-03-09 RX ADMIN — IBUPROFEN 600 MG: 600 TABLET ORAL at 11:48

## 2021-03-09 RX ADMIN — DOCUSATE SODIUM 100 MG: 100 CAPSULE ORAL at 20:34

## 2021-03-09 RX ADMIN — MAGNESIUM SULFATE HEPTAHYDRATE 2 G/HR: 40 INJECTION, SOLUTION INTRAVENOUS at 03:40

## 2021-03-09 RX ADMIN — IBUPROFEN 600 MG: 600 TABLET ORAL at 03:41

## 2021-03-09 RX ADMIN — IBUPROFEN 600 MG: 600 TABLET ORAL at 20:34

## 2021-03-09 RX ADMIN — HYDROCODONE BITARTRATE AND ACETAMINOPHEN 1 TABLET: 5; 325 TABLET ORAL at 18:30

## 2021-03-09 NOTE — PLAN OF CARE
Problem: Adult Inpatient Plan of Care  Goal: Absence of Hospital-Acquired Illness or Injury  Intervention: Identify and Manage Fall Risk  Recent Flowsheet Documentation  Taken 3/8/2021 1925 by Antionette Mario RN  Safety Promotion/Fall Prevention: safety round/check completed     Problem: Adult Inpatient Plan of Care  Goal: Absence of Hospital-Acquired Illness or Injury  Intervention: Prevent Skin Injury  Recent Flowsheet Documentation  Taken 3/8/2021 1925 by Antionette Mario RN  Body Position: sitting up in bed     Problem: Adult Inpatient Plan of Care  Goal: Absence of Hospital-Acquired Illness or Injury  Intervention: Prevent and Manage VTE (venous thromboembolism) Risk  Recent Flowsheet Documentation  Taken 3/8/2021 2340 by Antionette Mario RN  VTE Prevention/Management:   bilateral   sequential compression devices off   patient refused intervention  Taken 3/8/2021 2130 by Antionette Mario RN  VTE Prevention/Management:   bilateral   sequential compression devices on  Taken 3/8/2021 1925 by Antionette Mario RN  VTE Prevention/Management:   bilateral   sequential compression devices off   patient refused intervention     Problem: Adult Inpatient Plan of Care  Goal: Optimal Comfort and Wellbeing  Intervention: Provide Person-Centered Care  Recent Flowsheet Documentation  Taken 3/8/2021 1925 by Antionette Mario RN  Trust Relationship/Rapport:   care explained   choices provided   emotional support provided     Problem: Change in Fetal Wellbeing (Labor)  Goal: Stable Fetal Wellbeing  Intervention: Promote and Monitor Fetal Wellbeing  Recent Flowsheet Documentation  Taken 3/8/2021 1925 by Antionette Mario RN  Body Position: sitting up in bed     Problem: Adjustment to Role Transition (Postpartum Vaginal Delivery)  Goal: Successful Maternal Role Transition  Outcome: Ongoing, Progressing     Problem: Bleeding (Postpartum Vaginal Delivery)  Goal: Hemostasis  Outcome: Ongoing, Progressing     Problem:  Infection (Postpartum Vaginal Delivery)  Goal: Absence of Infection Signs and Symptoms  Outcome: Ongoing, Progressing     Problem: Pain (Postpartum Vaginal Delivery)  Goal: Acceptable Pain Control  Outcome: Ongoing, Progressing     Problem: Urinary Retention (Postpartum Vaginal Delivery)  Goal: Effective Urinary Elimination  Outcome: Ongoing, Progressing   Goal Outcome Evaluation:

## 2021-03-09 NOTE — PROGRESS NOTES
"      Derrick Salcido MD  [facility]  [address]  [place] [address]  Office [phone]  Fax [phone]    [facility]  Vaginal Delivery Progress Note    Subjective   Postpartum Day 1: Vaginal Delivery    The patient feels well.  Her pain is well controlled with nonsteroidal anti-inflammatory drugs and opioid analgesics.   She is not ambulating well.  Patient describes her bleeding as thin lochia.    Breastfeeding: infant latching.    Postpartum orders was estimated by development of a right labial hematoma which required return to the operating room for evacuation.  This was performed shortly after delivery, approximately 24 hours ago, under MAC anesthesia.  Her pain is well controlled.    She denies headache or visual disturbances or other symptoms of pregnancy-induced hypertension.    Objective     Vital Signs Range for the last 24 hours  Temperature: Temp:  [97.4 °F (36.3 °C)-98.6 °F (37 °C)] 98.5 °F (36.9 °C)   Temp Source: Temp src: Oral   BP: BP: (123-171)/() 156/98   Pulse: Heart Rate:  [] 84   Respirations: Resp:  [14-16] 16   SPO2: SpO2:  [95 %-100 %] 98 %   O2 Amount (l/min): Flow (L/min):  [3-8] 3   O2 Devices Device (Oxygen Therapy): nasal cannula   Weight:       Admit Height:  Height: 175.3 cm (69\")      Physical Exam:  General:  no acute distresss.  Abdomen: abdomen is soft without significant tenderness, masses, organomegaly or guarding. Fundus: appropriate, firm, non tender  Extremities: normal, atraumatic, no cyanosis, and trace edema.   Vulva is appropriate.  Right labia is swollen but consistent with what would be expected at this point.  There is some bruising.  Palpation does not reveal any significant fluid collection.    Lab results reviewed:  Yes   Rubella:  No results found for: RUBELLAIGGIN Nurse Transcribed from prenatal record --  No components found for: EXTRUBELQUAL  Rh Status:  No results found for: RH  Immunizations:   Immunization History "   Administered Date(s) Administered   • Flu Vaccine Intradermal Quad 18-64YR 10/05/2020   • Tdap 02/19/2021     Lab Results (last 24 hours)     ** No results found for the last 24 hours. **          Assessment/Plan       Severe preeclampsia      Sofia Caruso is Day 1  post-partum  Vaginal, Spontaneous  none .      Plan:  Continue current care.  Follow-up on H&H this morning.  Discontinue magnesium and Connell catheter.  Patient may have difficulty voiding due to the issues following delivery as well as the prolonged catheter.  She understands that in and out catheter may be needed but that a trial of spontaneous voiding is indicated at this time.  We will continue to watch blood pressures and treat as indicated.  Plan reviewed with the patient.  We will go to the postpartum floor later on this morning.      Derrick Salcido MD  3/9/2021  04:32 CST

## 2021-03-09 NOTE — LACTATION NOTE
"Mother's Name: Sofia    Phone #: [phone]  Infant Name: Lucio    : 3/[birth year]  Gestation: 35w1d  Day of life: 1  Birth weight: 5-4.3 (2390g)  Discharge weight:  Weight Loss: -2.14%  24 hour Summary of Feeds: 4 BFs + 1.3 ml BM + Formula X 3 (48 ml total)  Voids: 7 Stools: 4  Assistive devices (shields, shells, etc): None  Significant Maternal history: , IVF pregnancy, Migraines, Depression, severe pre-eclampsia  Maternal Concerns:   Maternal Goal: Breastfeeding while on maternity leave  Mother's Medications: PNV  Breastpump for home: Spectra  Ped follow up appt:    Called to room to assist. Patient voiced discomfort upon visit. She states, \"I don't feel well.\" Listened and offered support. Patient desires to formula feed at this time as she feels the need to rest. Assured patient of lactation support in decision to rest. Patient aware she needs to pump to better her chances for a full supply. Assisted SO with bottle feeding. Placed contact number on communication board. Recommended patient call when she feels able to pump/breastfeed. Questions/concerns denied at this time.     1140  Called to room to assist. Patient reports she is feeling much better, she is ready to breastfeed. Infant sleepy requiring stimulation. Placed infant on patient's chest. Hand expressed several drops of colostrum into infant's mouth in attempts to entice infant to breastfeed. No latched attained despite efforts. Infant uninterested in feeding at this time. Explained infant may not be ready as he just had formula 3 hours ago. Recommended patient keep infant skin to skin. Assured lactation will return to attempt again in 30 minutes to 1 hour. Questions denied.     1240  Assisted with positioning, hand expressing, and latching. Infant latched well after a few attempts. Intermittent deep jaw drops with audible swallows noted. Stimulation required throughout feed. Observed patient latch infant independently. Infant breast fed for 15 " minutes on the left breast. When infant was moved to the right breast, he became fussy, and pushing away. Patient discontinued feeding and chose to supplement at this time. Recommended patient pump after feeding infant. Discussed feeding plan. Questions denied.     1600  Patient requested assistance at this time. She states breastfeeding and bottle feeding have been so difficult. Patient reports exhaustion and concern that infant is not eating well. Discussed common feeding difficulties due to gestational age, assuring this will improve. Discussed feeding options. Recommended attempting to breastfeed, pumping after, feeding all expressed colostrum to infant, supplementing with formula as needed. Offered to help with feeding, however, patient chose to formula feed at this time. Assisted with bottle feeding and provided chin support. Patient return demonstrated understanding. Recommended she pump after feeding. No further assistance needed at this time.     Instructed mom our lactation team is here for continued support throughout their breastfeeding journey. Our team has encouraged mom to call with any questions or concerns that may arise after discharge.

## 2021-03-09 NOTE — ANESTHESIA POSTPROCEDURE EVALUATION
Patient: Sofia Caruso    Procedure Summary     Date: 03/07/21 Room / Location:     Anesthesia Start: 1913 Anesthesia Stop: 03/08/21 0157    Procedure: LABOR ANALGESIA Diagnosis:     Scheduled Providers:  Provider: Edward Rojo CRNA    Anesthesia Type: epidural ASA Status: 3          Anesthesia Type: epidural    Vitals  Vitals Value Taken Time   /93 03/09/21 0810   Temp 97.9 °F (36.6 °C) 03/09/21 0810   Pulse 91 03/09/21 0810   Resp 16 03/09/21 0810   SpO2 97 % 03/09/21 0810           Post Anesthesia Care and Evaluation    Patient location during evaluation: floor  Patient participation: complete - patient participated  Level of consciousness: awake and alert  Pain management: adequate  Airway patency: patent  Anesthetic complications: No anesthetic complications    Cardiovascular status: acceptable  Respiratory status: acceptable  Hydration status: acceptable  Post Neuraxial Block status: Motor and sensory function returned to baseline and No signs or symptoms of PDPH

## 2021-03-09 NOTE — LACTATION NOTE
Mother's Name:Sofia    Phone #:[phone]  Infant Name:  Lucio  :    Gestation:35w1d  Day of life:  Birth weight:   5 lbs 4.3 oz (2390 gm)  Discharge weight:  Weight Loss:   24 hour Summary of Feeds:  Voids:  Stools:  Assistive devices (shields, shells, etc): None  Significant Maternal history:, IVF pregnancy, Migraines, Depression, severe pre-eclampsia  Maternal Concerns: Not having an adequate milk supply  Maternal Goal: breastfeeding during maternity leave  Mother's Medications:   PNV  Breastpump for home: Spectra  Ped follow up appt:    Called to room per nursing per request of mom to assist with latching.  Mom states she is very tired from surgery and magnesium and request help with latching infant.  Infant sleepy, but rooting around on hands.  Mom has elastic everted nipples.  Infant placed in cradle position and opens wide when nipple is used to stimulate lips.  Multiple drops hand expressed and used to stimulate infant to open mouth.  Demonstrated this to mom, and how to bring infant to her and nipple rolling to get infant to latch.  Infant latches with a deep latch and sucks with long jaw drops.  Massage and compression of breast demonstrated and mom performing as demonstrated.  Infant will suck very effectively and swallows heard, then slow and appear asleep.  With breast massage infant will again suck with deep long jaw drops and swallows.  Will continue to assist as needed throughout the night.        Called to room per mom request to assist with latching infant again.  Infant laying on mom's chest with, with gown on, asleep.  Mom states he is really sleepy this time.  Placed infant in football position on left breast. Attempted to wake infant and stimulate mouth to open wide.  Infant would lick at breast and open mouth, but not latch.  Multiple drops hand expressed and placed in infant's mouth.  Infant moved to cradle position on right breast and again hand expressed drops and placed in  infant's mouth.  Infant started to root around and latched deeply with deep jaw for approx 5 minutes before falling asleep.  Removed, and stimulated with rula reflex.  Infant opening eyes and rooting on hands.  Placed in football position again on left breast and infant latched with deep latch and began to suck with deep jaw drops and swallows noted.  Good breast massage and compression performed while infant was latched.  Mom praised for good feeding this time.  Will continue to assist as needed tonight.          Instructed mom our lactation team is here for continued support throughout their breastfeeding journey. Our team has encouraged mom to call with any questions or concerns that may arise after discharge.

## 2021-03-09 NOTE — PLAN OF CARE
Goal Outcome Evaluation:  Plan of Care Reviewed With: patient, spouse  Progress: improving   Vitals signs stable. Feeling better this pm than this am. Less tired and dizziness. Rubra small. No clots. Fundus firm. Perineum still edema and purple maroon color. Voiding without difficulty quantity sufficient. No bowel movement. Passed hearing screen. Trying to find insert for head on car seat. Still needs car seat challenge. No respiratory distress. Mom not pumped today that I have seen. Still trying to get to latch. Only did one latch. Fatigues quickly.

## 2021-03-10 PROCEDURE — 63710000001 ONDANSETRON PER 8 MG: Performed by: OBSTETRICS & GYNECOLOGY

## 2021-03-10 RX ORDER — ALPRAZOLAM 0.5 MG/1
0.5 TABLET ORAL NIGHTLY PRN
Status: DISCONTINUED | OUTPATIENT
Start: 2021-03-10 | End: 2021-03-11 | Stop reason: HOSPADM

## 2021-03-10 RX ORDER — SUMATRIPTAN 50 MG/1
50 TABLET, FILM COATED ORAL
Status: DISCONTINUED | OUTPATIENT
Start: 2021-03-10 | End: 2021-03-11 | Stop reason: HOSPADM

## 2021-03-10 RX ORDER — METOCLOPRAMIDE 10 MG/1
10 TABLET ORAL EVERY 8 HOURS PRN
Status: DISCONTINUED | OUTPATIENT
Start: 2021-03-10 | End: 2021-03-11 | Stop reason: HOSPADM

## 2021-03-10 RX ADMIN — IBUPROFEN 600 MG: 600 TABLET ORAL at 04:53

## 2021-03-10 RX ADMIN — METOCLOPRAMIDE 10 MG: 10 TABLET ORAL at 08:37

## 2021-03-10 RX ADMIN — HYDROCODONE BITARTRATE AND ACETAMINOPHEN 1 TABLET: 5; 325 TABLET ORAL at 03:09

## 2021-03-10 RX ADMIN — DOCUSATE SODIUM 100 MG: 100 CAPSULE ORAL at 20:50

## 2021-03-10 RX ADMIN — SERTRALINE HYDROCHLORIDE 25 MG: 50 TABLET, FILM COATED ORAL at 21:31

## 2021-03-10 RX ADMIN — ALPRAZOLAM 0.5 MG: 0.5 TABLET ORAL at 20:50

## 2021-03-10 RX ADMIN — IBUPROFEN 600 MG: 600 TABLET ORAL at 20:50

## 2021-03-10 RX ADMIN — SUMATRIPTAN SUCCINATE 50 MG: 50 TABLET, FILM COATED ORAL at 11:52

## 2021-03-10 RX ADMIN — ONDANSETRON 4 MG: 4 TABLET, FILM COATED ORAL at 03:10

## 2021-03-10 RX ADMIN — IBUPROFEN 600 MG: 600 TABLET ORAL at 13:03

## 2021-03-10 NOTE — PROGRESS NOTES
"[facility]  Vaginal Delivery Progress Note    Subjective   Postpartum Day 2: Vaginal Delivery    The patient feels well.  Her pain is well controlled with nonsteroidal anti-inflammatory drugs and opioid analgesics.   She is ambulating well.  Patient describes her bleeding as moderate lochia.  She does report a headache this morning but feels like it is related to her migraines.    Breastfeeding: infant latching without difficulty.    Objective     Vital Signs Range for the last 24 hours  Temperature: Temp:  [97.9 °F (36.6 °C)-98.5 °F (36.9 °C)] 97.9 °F (36.6 °C)   Temp Source: Temp src: Temporal   BP: BP: (134-179)/() 134/88   Pulse: Heart Rate:  [] 94   Respirations: Resp:  [16-19] 18   SPO2: SpO2:  [96 %-97 %] 96 %   O2 Amount (l/min):     O2 Devices Device (Oxygen Therapy): room air   Weight:       Admit Height:  Height: 175.3 cm (69\")      Physical Exam:  General:  no acute distresss.  Abdomen: abdomen is soft without significant tenderness, masses, organomegaly or guarding. Fundus: appropriate, firm, non tender  Extremities: normal, atraumatic, no cyanosis, and trace edema.     Lab results reviewed:  Yes   Rubella:  No results found for: RUBELLAIGGIN Nurse Transcribed from prenatal record --  No components found for: EXTRUBELQUAL  Rh Status:  No results found for: RH  Immunizations:   Immunization History   Administered Date(s) Administered   • Flu Vaccine Intradermal Quad 18-64YR 10/05/2020   • Tdap 02/19/2021     Lab Results (last 24 hours)     ** No results found for the last 24 hours. **          Assessment/Plan       Severe preeclampsia      Sofia Caruso is Day 2  post-partum  Vaginal, Spontaneous  none .      Plan:  Continue current care continue to monitor blood pressures.  Patient previously resolved a dose of Procardia 60 XL.  We will also try Reglan for her headache at this time.      Nataliia Salvador DO  3/10/2021  07:44 CST      "

## 2021-03-10 NOTE — LACTATION NOTE
This note was copied from a baby's chart.  Pt emphatically reports today that she wishes to formula feed. Offered assistance with bfing, or to help her overcome barriers to same. She maintains formula use is her preferred option.  Suppression teaching and safe formula prep explained with handouts given. Urged mother to cont to hold infant skin to skin.

## 2021-03-10 NOTE — PLAN OF CARE
Goal Outcome Evaluation:  Plan of Care Reviewed With: patient, spouse  Progress: improving  Outcome Summary: blood pressure elevated at beginning of shift after patient received one time dose of procardia XL (167/97 with a repeat of 179/107), LDR was notified and hypertensive emergency protocol was initiated, with different blood pressure cuff - LDR got 155/96 and 149/95 and doctor restarted patient's home anxiety medication, patient has rested tonight and BP has been below 140/90, ff ml u1, scant lochia, bruising and edema noted to perineum due to labial hematoma that was previously evacuated, patient complains of a headache this shift, reflexes wnl and no clonus, will continue to monitor

## 2021-03-10 NOTE — NURSING NOTE
Labor and delivery staff called to evaluate patient's high blood pressures. BRODERICK Mario RN and NELIDA Sevilla RN went to evaluate. The red blood pressure cuff was used to take patient's blood pressures while laying in the bed. The readings were lower than the previous readings. Patient seemed anxious and was asked about any anxiety medications taken before delivery. Patient took zoloft and xanax at home as prescribed. Dr. Davis was called and updated on the situation, and orders were given to resume anxiety medications. Will continue to monitor.    Antionette Mario, RN   
Pt off monitors from 0809 to 0907 per MD order.  
Pt sent from office to LDR with elevated BP's.  
Pt sitting up for epidural [id number]. FHR tracing maternal HR.     Shannen Sanchez, RN    
Pt transferred to OR at 0600.    Shannen Sanchez RN    
Breath sounds clear and equal bilaterally.

## 2021-03-10 NOTE — LACTATION NOTE
Mother's Name: Sofia    Phone #: [phone]  Infant Name: Lucio    : 3/8/[birth year]  Gestation: 35w1d  Day of life: 1  Birth weight: 5-4.3 (2390g)  Discharge weight:  Weight Loss: -2.14%  24 hour Summary of Feeds: 4 BFs + 1.3 ml BM + Formula X 3 (48 ml total)  Voids: 7 Stools: 4  Assistive devices (shields, shells, etc): None  Significant Maternal history: , IVF pregnancy, Migraines, Depression, severe pre-eclampsia  Maternal Concerns:   Maternal Goal: Breastfeeding while on maternity leave  Mother's Medications: PNV  Breastpump for home: Spectra  Ped follow up appt:    Follow-up with mom.  She states she has had a bad day.  She has been very tired and infant has been fussy.  She states she is going to supplement with formula and continue to try to continue pumping to stimulate her milk supply.  Infant is sleeping on her chest at this time, swaddled.  She plans to pump tonight and feed infant formula via bottle.  Gave patient information on breastfeeding and supplementing.     Instructed mom our lactation team is here for continued support throughout their breastfeeding journey. Our team has encouraged mom to call with any questions or concerns that may arise after discharge.

## 2021-03-10 NOTE — PLAN OF CARE
Goal Outcome Evaluation:  Plan of Care Reviewed With: patient  Progress: improving  Outcome Summary: fundus firm ml uu  to u1, lochia scant rubra, labia cont bruised with edema, bp better, c/o ha reglan 10 mg po given with some improvememnt imitrex 50 mg given x1 (home med) with much improvement, has decided to formula feed only,

## 2021-03-11 VITALS
WEIGHT: 240 LBS | TEMPERATURE: 98.5 F | DIASTOLIC BLOOD PRESSURE: 99 MMHG | HEART RATE: 77 BPM | BODY MASS INDEX: 35.55 KG/M2 | HEIGHT: 69 IN | OXYGEN SATURATION: 99 % | SYSTOLIC BLOOD PRESSURE: 143 MMHG | RESPIRATION RATE: 18 BRPM

## 2021-03-11 PROCEDURE — 63710000001 ONDANSETRON PER 8 MG: Performed by: OBSTETRICS & GYNECOLOGY

## 2021-03-11 RX ORDER — ALPRAZOLAM 0.5 MG/1
0.5 TABLET ORAL 2 TIMES DAILY PRN
Qty: 15 TABLET | Refills: 0 | Status: SHIPPED | OUTPATIENT
Start: 2021-03-11 | End: 2021-03-19

## 2021-03-11 RX ORDER — SERTRALINE HYDROCHLORIDE 25 MG/1
25 TABLET, FILM COATED ORAL DAILY
Qty: 30 TABLET | Refills: 4 | Status: SHIPPED | OUTPATIENT
Start: 2021-03-11

## 2021-03-11 RX ORDER — ONDANSETRON 4 MG/1
4 TABLET, ORALLY DISINTEGRATING ORAL EVERY 8 HOURS PRN
Qty: 15 TABLET | Refills: 1 | Status: SHIPPED | OUTPATIENT
Start: 2021-03-11 | End: 2021-04-19

## 2021-03-11 RX ADMIN — IBUPROFEN 600 MG: 600 TABLET ORAL at 13:33

## 2021-03-11 RX ADMIN — ONDANSETRON 4 MG: 4 TABLET, FILM COATED ORAL at 07:40

## 2021-03-11 RX ADMIN — IBUPROFEN 600 MG: 600 TABLET ORAL at 05:48

## 2021-03-11 NOTE — DISCHARGE SUMMARY
[facility]    Sharif Sams MD  [address]  [place] [address]  [phone]      Discharge Summary     Lamar Caruso  : 1990  MRN: [id number]  CSN: [id number]    Date of Admission: 3/5/2021   Date of Discharge:  3/11/2021   Delivering Physician: Sharif Sams        Admission Diagnosis: 1. Severe preeclampsia [O14.10]   Discharge Diagnosis: 1. Pregnancy at 35w4d - delivered       Procedures: 3/8/2021  - Vaginal, Spontaneous       Hospital Course  Patient is a 30 y.o.  who at 35w4d had a vaginal birth.  Her postpartum course was complicated by a large right labial hematoma which required evacuation and repair in the operating room.  On PPD #3 she was ready for discharge.  Her blood pressure was stable and so was the labial swelling. she had normal lochia and pain well controlled with oral medications.    Infant  male  fetus weighing 2390 g (5 lb 4.3 oz)   Apgars -  8 @ 1 minute /  8 @ 5 minutes.    Discharge labs  Lab Results   Component Value Date    WBC 14.30 (H) 2021    HGB 8.2 (L) 2021    HCT 24.5 (L) 2021     2021       Discharge Medications     Discharge Medications      New Medications      Instructions Start Date   ALPRAZolam 0.5 MG tablet  Commonly known as: XANAX   0.5 mg, Oral, 2 Times Daily PRN      ondansetron ODT 4 MG disintegrating tablet  Commonly known as: Zofran ODT   4 mg, Translingual, Every 8 Hours PRN      sertraline 25 MG tablet  Commonly known as: ZOLOFT   25 mg, Oral, Daily         Continue These Medications      Instructions Start Date   prenatal (CLASSIC) vitamin  tablet  Generic drug: prenatal vitamin   Oral, Daily             Discharge Disposition Home or Self Care   Condition on Discharge: good   Follow-up: 1 week with Loly Sams MD  3/11/2021

## 2021-03-11 NOTE — PAYOR COMM NOTE
"REF:  [id number]    [facility]  MAJO,  [phone]  OR  FAX  [phone]    Shady Sofia RAE (30 y.o. Female)     Date of Birth Social Security Number Address Home Phone MRN    1990  [address] [place] [address] [phone] [id number]    Congregational Marital Status          None        Admission Date Admission Type Admitting Provider Attending Provider Department, Room/Bed    3/5/21 Elective Derrick Salcido MD Tolar, Stewart B, MD [facility] MOTHER BABY 2A, M238/1    Discharge Date Discharge Disposition Discharge Destination         Home or Self Care              Attending Provider: Derrick Salcido MD    Allergies: No Known Allergies    Isolation: None   Infection: None   Code Status: CPR    Ht: 175.3 cm (69\")   Wt: 109 kg (240 lb)    Admission Cmt: None   Principal Problem: Severe preeclampsia [O14.10]                 Active Insurance as of 3/5/2021     Primary Coverage     Payor Plan Insurance Group Employer/Plan Group    [employer] PPO [id number]     Payor Plan Address Payor Plan Phone Number Payor Plan Fax Number Effective Dates    [address] [phone]  2019 - None Entered    [place] [address]       Subscriber Name Subscriber Birth Date Member ID       EMERITA LOPEZ 1987 [id number]                 Emergency Contacts      (Rel.) Home Phone Work Phone Mobile Phone    ShadyGeoff (Spouse) -- -- [phone]      3/5/2021 TO LABOR AND DELIVERY   EDC 2021  GESTATIONAL AGE  35/4     G-1 P-0     3/8/2021 AT 1:56 AM    VAGINAL DELIVERY    MALE   APGAR 8/8  WEIGHT 2390 GRAMS  INFANT TO [facility]      3/10/21  PATIENT WITH  ELEVATED BLOOD PRESSURE  AND PATIENT COMPLAINING OF HEADACHE  RATING PAIN AT 2, BILATERAL ACHING   XANAX PO X 1  NORCO X 1  MOTRIN PO X 3  REGLAN  PO X 1    ZOFRAN X PO X 1   IMITREX PO X 1  Date/Time  Temp  Pulse  Resp  BP  Device (Oxygen Therapy)  SpO2   21 " 0744  98.5 (36.9)  77  18  143/99  room air  99   21 0548  98.1 (36.7)  90  18  150/90  room air  98   21 0023  97.5 (36.4)  77  18  150/87  room air  97   03/10/21 2054  97.9 (36.6)  90  18  147/91  room air  97   03/10/21 1601  98 (36.7)  88  18  --  room air  98   03/10/21 1256  98.3 (36.8)  87  18  156/91  room air  97   03/10/21 0933  --  97  18  135/65  room air  97   03/10/21 0812  97.8 (36.6)  98  18  145/98  room air  98   03/10/21 0451  97.9 (36.6)  94  18  134/88  room air  96   03/10/21 0031  98.2 (36.8)  98  18  149/85  room air  96   21 2307  --  --  --  147/87  --  --   214  --  84  --  145/91  --  --   21 1959  --  105  --  149/95  --  --   21 1950  --  104  --  155/96  --  --   21 1945  98.5 (36.9)  113  18  179/107Abnormal   room air  97   21 1920  --  --  --  167/97  --  --   21 1822  --  94  --  172/94  --  --   21 1755  --  --  --  172/94  --  --   21 1735  --  --  --  170/93  --  --   21 1600  98.3 (36.8)  93  19  156/99   room air  97   BP: 170/98, 169/91 stressed at 21 1600   21 1158  98.2 (36.8)  82  17  139/83  room air  97   21 0810  97.9 (36.6)  91  16  144/93  room air  97   21 0731  --  83  --  143/98  --  --   21 0722  --  89  --  132/82  --  --   21 0700  97.4 (36.3)  79  16  145/86  --  --   21 0632  --  78  --  141/89  --  --          History & Physical      Derrick Salcido MD at 21 1131              Derrick Salcido MD  [facility]  [address] [facility] [address] [place] [address]  Office [phone]  Fax [phone]      [place]  Sofia Caruso  : 1990  MRN: [id number]  CSN: [id number]    History and Physical    Subjective   Sofia Caruso is a 30 y.o. year old  with an Estimated Date of Delivery: 21 presenting for induction of labor for severe preeclampsia.  Diagnosis is based on blood pressure criteria as well as a headache.  She  presently reports a headache but has somewhat improved with Tylenol.  However, blood pressure readings have reliably been in the severe range since her presentation today as well as recent checks in the office.  She has not had 2 back-to-back readings to necessitate IV treatment but she has had greater than 160 systolic and greater than 110 diastolic on at least 2 different occasions.    Risks of labor induction including prolongation of labor, increased risks for both  section and operative vaginal birth have been discussed at length.     Prenatal care has been with Dr. Salcido  It has been complicated by severe pre-eclampsia.    OB History    Para Term  AB Living   1 0 0 0 0 0   SAB TAB Ectopic Molar Multiple Live Births   0 0 0 0 0 0      # Outcome Date GA Lbr Immanuel/2nd Weight Sex Delivery Anes PTL Lv   1 Current                Past Medical History:   Diagnosis Date   • Depression    • Infertility, female    • Migraine    • Migraine        Past Surgical History:   Procedure Laterality Date   • ADENOIDECTOMY     • APPENDECTOMY     • TONSILLECTOMY     • WISDOM TOOTH EXTRACTION           Current Facility-Administered Medications:   •  acetaminophen (TYLENOL) tablet 650 mg, 650 mg, Oral, Q4H PRN, Derrick Salcido MD  •  butorphanol (STADOL) injection 1 mg, 1 mg, Intravenous, Q3H PRN, Derrick Salcido MD  •  butorphanol (STADOL) injection 2 mg, 2 mg, Intravenous, Q3H PRN, Derrick Salcido MD  •  lactated ringers infusion, 125 mL/hr, Intravenous, Continuous, Derrick Salcido MD  •  lidocaine PF 1% (XYLOCAINE) injection 5 mL, 5 mL, Intradermal, PRN, Derrick Salcido MD  •  miSOPROStol (CYTOTEC) split tablet 25 mcg, 25 mcg, Oral, Q4H, Derrick Salcido MD  •  ondansetron (ZOFRAN) tablet 4 mg, 4 mg, Oral, Q6H PRN **OR** ondansetron (ZOFRAN) injection 4 mg, 4 mg, Intravenous, Q6H PRN, Derrick Salcido MD  •  sodium chloride 0.9 % flush 1-10 mL, 1-10 mL, Intravenous, PRN, Derrick Salcido MD  •   "sodium chloride 0.9 % flush 3 mL, 3 mL, Intravenous, Q12H, Derrick Salcido MD  •  terbutaline (BRETHINE) injection 0.25 mg, 0.25 mg, Subcutaneous, PRN, Derrick Salcido MD    No Known Allergies    Family History   Problem Relation Age of Onset   • Colon cancer Mother 45   • Breast cancer Paternal Aunt 42   • Diabetes Maternal Grandmother    • Hyperlipidemia Other    • Hypertension Other    • Cancer Other    • Heart disease Father    • Colon cancer Paternal Uncle 60   • Esophageal cancer Paternal Uncle 42   • Ovarian cancer Neg Hx    • Uterine cancer Neg Hx    • Melanoma Neg Hx    • Prostate cancer Neg Hx        Social History     Socioeconomic History   • Marital status:      Spouse name: Not on file   • Number of children: Not on file   • Years of education: Not on file   • Highest education level: Not on file   Tobacco Use   • Smoking status: Never Smoker   • Smokeless tobacco: Never Used   Substance and Sexual Activity   • Alcohol use: Not Currently   • Drug use: No   • Sexual activity: Yes     Partners: Male     Birth control/protection: None     Comment: csp for 5 years       Review of Systems        Objective   BP (!) 154/102 (BP Location: Right arm, Patient Position: Sitting)   Pulse 71   Temp 99.4 °F (37.4 °C) (Temporal)   Resp 18   Ht 175.3 cm (69\")   Wt 109 kg (240 lb)   Breastfeeding Yes   BMI 35.44 kg/m²     General: well developed; well nourished  no acute distress   Abdomen: soft, non-tender; bowel sounds normal; no masses, no organomegaly   Cervix: 0 cm / 20 % / -3 / soft / posterior   Presentation: cephalic based on ultrasound done today in the office (BPP 8 out of 8 and normal amniotic fluid volume)    EFW 5 to 6 pounds by Leopold's  Pelvis subjectively adequate     Prenatal Labs  Lab Results   Component Value Date    HGB 12.4 03/05/2021    RUBELLASCRN Immune 10/08/2014    HEPBSAG Negative 09/02/2020    ABO A 03/05/2021    RH Positive 03/05/2021    ABSCRN Negative 03/05/2021    " MPT3KCM1 Non Reactive 09/02/2020    HEPCVIRUSABY Negative 05/17/2016    URINECX Final report 09/02/2020       Current Labs Reviewed   Admission labs, prenatal labs       Assessment   1. IUP with an Estimated Date of Delivery: 4/8/21  2. Induction of labor because of severe preeclampsia based on blood pressure criteria  3. Group B strep status: unknown  4. Fetal status reassuring overall     Plan   1. Cytotec ripening followed by pitocin per protocol   2. Magnesium sulfate when indicated  3. Antibiotic prophylaxis in labor  4. As needed blood pressure medicine as indicated  5. Continuous fetal monitoring  6. Continuous tocometer    Derrick Salcido MD  3/5/2021  11:31 CST       Electronically signed by Derrick Salcido MD at 03/05/21 1133         Facility-Administered Medications as of 3/11/2021   Medication Dose Route Frequency Provider Last Rate Last Admin   • [COMPLETED] acetaminophen (TYLENOL) tablet 650 mg  650 mg Oral Once Derrick Salcido MD   650 mg at 03/05/21 1103   • [COMPLETED] ALPRAZolam (XANAX) tablet 0.5 mg  0.5 mg Oral Nightly PRN Rose Marie Davis MD   0.5 mg at 03/09/21 2034   • ALPRAZolam (XANAX) tablet 0.5 mg  0.5 mg Oral Nightly PRN Nataliia Salvador DO   0.5 mg at 03/10/21 2050   • benzocaine-menthol (DERMOPLAST) 20-0.5 % topical spray   Topical PRN Sharif Sams MD   Given at 03/08/21 1735   • bisacodyl (DULCOLAX) suppository 10 mg  10 mg Rectal Daily PRN Sharif Sams MD       • calcium carbonate (TUMS) chewable tablet 500 mg (200 mg elemental)  2 tablet Oral TID PRN Sharif Sams MD       • [COMPLETED] cyclobenzaprine (FLEXERIL) tablet 5 mg  5 mg Oral Once Derrick Salcido MD   5 mg at 03/05/21 1648   • [COMPLETED] dinoprostone (CERVIDIL) vaginal insert 10 mg  10 mg Vaginal Once Sharif Sams MD   10 mg at 03/06/21 0905   • docusate sodium (COLACE) capsule 100 mg  100 mg Oral BID Sharif Sams MD   100 mg at 03/10/21 2050   • HYDROcodone-acetaminophen  (NORCO) 5-325 MG per tablet 1 tablet  1 tablet Oral Q4H PRN Panola, Sharif DELAROSA MD   1 tablet at 03/10/21 0309   • Hydrocort-Pramoxine (Perianal) (PROCTOFOAM-HS) 1-1 % rectal foam 1 applicator  1 applicator Rectal PRN Panola, Sharif DELAROSA MD       • Hydrocortisone (Perianal) (ANUSOL-HC) 2.5 % rectal cream 1 application  1 application Rectal PRN Panola, Sharif DELAROSA MD       • hydrOXYzine (ATARAX) tablet 50 mg  50 mg Oral Nightly PRN Panola, Sharif DELAROSA MD       • ibuprofen (ADVIL,MOTRIN) tablet 600 mg  600 mg Oral Q8H PRN Panola, Sharif DELAROSA MD   600 mg at 03/11/21 0548   • [COMPLETED] labetalol (NORMODYNE,TRANDATE) injection 20 mg  20 mg Intravenous Once PRN Derrick Salcido MD   20 mg at 03/05/21 1213   • [COMPLETED] labetalol (NORMODYNE,TRANDATE) injection 20 mg  20 mg Intravenous Once PanolaSharif MD   20 mg at 03/08/21 0320   • [COMPLETED] labetalol (NORMODYNE,TRANDATE) injection 40 mg  40 mg Intravenous Once PRN Derrick Salcido MD   40 mg at 03/05/21 1244   • magnesium hydroxide (MILK OF MAGNESIA) suspension 2400 mg/10mL 10 mL  10 mL Oral Daily PRN Panola, Sharif DELAROSA MD       • Measles, Mumps & Rubella Vac (MMR) injection 0.5 mL  0.5 mL Subcutaneous Once PRN PanolaSharif handy MD       • [COMPLETED] metoclopramide (REGLAN) injection 10 mg  10 mg Intravenous Once PanolaSharif handy MD   10 mg at 03/06/21 0815   • [COMPLETED] metoclopramide (REGLAN) injection 10 mg  10 mg Intravenous Once PanolaSharif handy MD   10 mg at 03/08/21 0021   • metoclopramide (REGLAN) tablet 10 mg  10 mg Oral Q8H PRN Nataliia Salvador DO   10 mg at 03/10/21 0837   • miSOPROStol (CYTOTEC) tablet 600 mcg  600 mcg Oral Once PRN PanolaSharif MD       • Morphine sulfate (PF) injection 1 mg  1 mg Intravenous Q4H PRN PanolaSharif handy MD        And   • naloxone (NARCAN) injection 0.4 mg  0.4 mg Intravenous Q5 Min PRN PanolaSharif handy MD       • [COMPLETED] NIFEdipine XL (PROCARDIA XL) 24 hr tablet 60  mg  60 mg Oral Once Rose Marie Davis MD   60 mg at 21 1822   • ondansetron (ZOFRAN) injection 4 mg  4 mg Intravenous Q6H PRN Sharif Sasm MD       • ondansetron (ZOFRAN) tablet 4 mg  4 mg Oral Q8H PRN Sharif Sams MD   4 mg at 21 0740   • [] oxytocin (PITOCIN) 30 units in 0.9% sodium chloride 500 mL (premix)  250 mL/hr Intravenous Continuous Derrick Salcido  mL/hr at 21 0214 250 mL/hr at 21 0214   • [COMPLETED] penicillin g 5 mu/100 mL 0.9% NS IVPB (mbp)  5 Million Units Intravenous Once Sharif Sams MD   5 Million Units at 21 1109   • prenatal vitamin tablet 1 tablet  1 tablet Oral Daily Sharif Sams MD   1 tablet at 21 0853   • promethazine (PHENERGAN) tablet 25 mg  25 mg Oral Q6H PRN Sharif Sams MD        Or   • promethazine (PHENERGAN) suppository 12.5 mg  12.5 mg Rectal Q6H PRN Sharif Sams MD       • sertraline (ZOLOFT) tablet 25 mg  25 mg Oral Daily Rose Marie Davis MD   25 mg at 03/10/21 2131   • sodium chloride 0.9 % flush 1-10 mL  1-10 mL Intravenous PRN Sharif Sams MD   4 mL at 21 0854   • SUMAtriptan (IMITREX) tablet 50 mg  50 mg Oral Q2H PRN Nataliia Salvador DO   50 mg at 03/10/21 1152        Operative/Procedure Notes (last 7 days) (Notes from 21 1000 through 21 1000)      Sharif Sams MD at 21 0218          [facility]  Vaginal Delivery Note    Delivery     Delivery: Vaginal, Spontaneous     YOB: 2019    Time of Birth:  Gestational Age 1:56 AM   35w4d     Anesthesia: Epidural     Delivering clinician: Sharif Sams    Forceps?   No   Vacuum? No    Shoulder dystocia present: No        Delivery narrative:  Progressed to C/C/+2 and pushed well to deliver a LVIM. AMY to LOT vigorous to mom's abdomen. NICU present for delivery. Placenta spontaneous and intact with 3VC after IV Pitocin. 2nd degree perineal laceration repaired with 2-0 Vicryl  Rapide. Epidural anes. EBL 250mL. No complications. Sponge and needle count correct.     Infant    Findings: male  infant     Infant observations: Weight: 2390 g (5 lb 4.3 oz)   Length: 18  in  Observations/Comments:  aga      Apgars: 8  @ 1 minute /    8  @ 5 minutes   Infant Name:      Placenta, Cord, and Fluid    Placenta delivered  Spontaneous  at   3/8/2021  2:00 AM     Cord: 3 vessels  present.   Nuchal Cord?  no   Cord blood obtained: No    Cord gases obtained:  No    Cord gas results: Venous:  No results found for: PHCVEN    Arterial:  No results found for: PHCART     Repair    Episiotomy: None     No    Lacerations: Yes  Laceration Information  Laceration Repaired?   Perineal: 2nd  Yes    Periurethral:       Labial:       Sulcus:       Vaginal:       Cervical:         Suture used for repair: 2-0 Vicryl Rapide   Estimated Blood Loss:  250 mL           Complications  hypertension    Disposition  Mother to Mother Baby/Postpartum  in stable condition currently.  Baby to remains with mom  in stable condition currently.      Sharif Sams MD  21  02:18 CST        Electronically signed by Sharif Sams MD at 21 0220     Sharif Sams MD at 21 0514           [place]  Sofiamorris Caruso  : 1990  MRN: [id number]  CSN: [id number]  Date: 3/8/2021    Operative Note    VULVA BIOPSY      Pre-op Diagnosis:  hematoma of right labia, postpartum   Post-op Diagnosis:   Same   Procedure: Procedure(s):  incision and drainage of labia hematoma   Surgeon: Surgeon(s):  Sharif Sams MD Tolar, Stewart B, MD       Anesthesia:  Monitored anesthesia care     Estimated Blood Loss: 600   mls   Fluids: 1000   mls   UOP: 300   mls   Drains:  Connell catheter   ABx:  Cefoxitin     Specimens:   None   Findings:  Massively enlarged right labia with underlying hematoma.  This extended anteriorly to the mons pubis and posteriorly towards the buttocks lateral to the anus.  It did not cross  midline.  There was no arterial bleed identified, but there were multiple areas of oozing.   Complications:  None     INDICATION: Sofia Caruso is a 30 y.o. female with recent vaginal delivery where she sustained a second-degree perineal laceration.  This was repaired and hemostatic, but a couple hours later she developed rapidly worsening pressure and perineal pain.  For my initial evaluation to exam under anesthesia in the operating room, the hematoma had roughly doubled in size.  PROCEDURE: After informed consent was obtained, the patient was taken to the operating room where monitored anesthesia was administered. She was placed in the lithotomy position in [facility] and her perineum was prepped and draped in normal sterile fashion.    A vertical incision was made along the base of the right labia majora, approximately 8 cm in length, to allow access to the hematoma.  The clots were completely evacuated and the areas of tracking identified as summarized above.  There was no arterial bleeding identified.  Several areas of oozing along the medial and lateral borders underlying the right vaginal sidewall were made hemostatic with figure-of-eight sutures of 0 Vicryl.  At this point there was no blood welling up within the wound and hemostasis was adequate.  The wound cavity was then closed in layers with multiple interrupted sutures of 0 Vicryl.  The subcutaneous tissues were then closed with a running suture of 2-0 Vicryl.  The skin was then closed with a subcuticular stitch of 3-0 Vicryl Rapide.  At the conclusion of the case there was still some edema at the anterior right labia majora, but the remainder of the edema had almost completely resolved.  The area was observed for several minutes and no active bleeding, or expansion of swelling was noted.   The patient was then awakened by anesthesia and taken to the recovery room in stable condition. She tolerated the procedure well without complications. All  "sponge, needle and instrument counts were correct times 3 per the operating room staff.     Dr Salcido was present as an assistant for the case and responsible for performing the following critical activities: Retraction and Suction and their skilled assistance was necessary for the success of this case.  Without his presence, visualization would have been much more difficult and the procedure certainly would have taken longer.  His knowledge and experience was also vital to the success of the procedure.      Sharif Sams MD   3/8/2021  08:39 CST    Electronically signed by Sharif Sams MD at 03/08/21 0848          Physician Progress Notes (last 72 hours) (Notes from 03/08/21 1000 through 03/11/21 1000)      Sharif Smas MD at 03/11/21 0763                Sharif Sams MD  [facility]  [address] [facility] [address] [place] [address]  Office [phone]  Fax [phone]    [facility]  Vaginal Delivery Progress Note    Subjective   Postpartum Day 3: Vaginal Delivery    The patient feels well.  Her pain is well controlled with nonsteroidal anti-inflammatory drugs.   She is ambulating and voiding well.  Patient describes her bleeding as thin lochia. She states that her swelling is stable and sore. She is having some occasional nausea.    Breastfeeding: infant latching without difficulty.    Objective     Vital Signs Range for the last 24 hours  Temperature: Temp:  [97.5 °F (36.4 °C)-98.5 °F (36.9 °C)] 98.5 °F (36.9 °C)   Temp Source: Temp src: Temporal   BP: BP: (135-156)/(65-99) 143/99   Pulse: Heart Rate:  [77-98] 77   Respirations: Resp:  [18] 18   SPO2: SpO2:  [97 %-99 %] 99 %   O2 Amount (l/min):     O2 Devices Device (Oxygen Therapy): room air   Weight:       Admit Height:  Height: 175.3 cm (69\")      Physical Exam:  General:  no acute distresss.  Abdomen: Fundus: appropriate, firm, non tender  Extremities: normal, atraumatic, no cyanosis, and trace edema.   Vulvar bruising and swelling " "stable      Lab results reviewed:  Yes   Rubella:  No results found for: RUBELLAIGGIN Nurse Transcribed from prenatal record --  No components found for: EXTRUBELQUAL  Rh Status:  No results found for: RH  Immunizations:   Immunization History   Administered Date(s) Administered   • Flu Vaccine Intradermal Quad 18-64YR 10/05/2020   • Tdap 02/19/2021     Lab Results (last 24 hours)     ** No results found for the last 24 hours. **          Assessment/Plan       Severe preeclampsia      Sofia Caruso is Day 3  post-partum  Vaginal, Spontaneous  none .      Plan: BP better over the last 24 hours, she is able to monitor at home. Discharge home with standard precautions and return to clinic in 1 week.      Sharif Sams MD  3/11/2021  07:49 CST        Electronically signed by Sharif Sams MD at 03/11/21 0751     Nataliia Salvador DO at 03/10/21 0744          [facility]  Vaginal Delivery Progress Note    Subjective   Postpartum Day 2: Vaginal Delivery    The patient feels well.  Her pain is well controlled with nonsteroidal anti-inflammatory drugs and opioid analgesics.   She is ambulating well.  Patient describes her bleeding as moderate lochia.  She does report a headache this morning but feels like it is related to her migraines.    Breastfeeding: infant latching without difficulty.    Objective     Vital Signs Range for the last 24 hours  Temperature: Temp:  [97.9 °F (36.6 °C)-98.5 °F (36.9 °C)] 97.9 °F (36.6 °C)   Temp Source: Temp src: Temporal   BP: BP: (134-179)/() 134/88   Pulse: Heart Rate:  [] 94   Respirations: Resp:  [16-19] 18   SPO2: SpO2:  [96 %-97 %] 96 %   O2 Amount (l/min):     O2 Devices Device (Oxygen Therapy): room air   Weight:       Admit Height:  Height: 175.3 cm (69\")      Physical Exam:  General:  no acute distresss.  Abdomen: abdomen is soft without significant tenderness, masses, organomegaly or guarding. Fundus: appropriate, firm, non tender  Extremities: " normal, atraumatic, no cyanosis, and trace edema.     Lab results reviewed:  Yes   Rubella:  No results found for: RUBELLAIGGIN Nurse Transcribed from prenatal record --  No components found for: EXTRUBELQUAL  Rh Status:  No results found for: RH  Immunizations:   Immunization History   Administered Date(s) Administered   • Flu Vaccine Intradermal Quad 18-64YR 10/05/2020   • Tdap 02/19/2021     Lab Results (last 24 hours)     ** No results found for the last 24 hours. **          Assessment/Plan       Severe preeclampsia      Sofia Caruso is Day 2  post-partum  Vaginal, Spontaneous  none .      Plan:  Continue current care continue to monitor blood pressures.  Patient previously resolved a dose of Procardia 60 XL.  We will also try Reglan for her headache at this time.      Nataliia Salvador DO  3/10/2021  07:44 CST        Electronically signed by Nataliia Salvador DO at 03/10/21 0784     Derrick Salcido MD at 03/09/21 0432                Derrick BETANCOURT. MD Loly  [facility]  [address] [facility] [address] [place] [address]  Office [phone]  Fax [phone]    [facility]  Vaginal Delivery Progress Note    Subjective   Postpartum Day 1: Vaginal Delivery    The patient feels well.  Her pain is well controlled with nonsteroidal anti-inflammatory drugs and opioid analgesics.   She is not ambulating well.  Patient describes her bleeding as thin lochia.    Breastfeeding: infant latching.    Postpartum orders was estimated by development of a right labial hematoma which required return to the operating room for evacuation.  This was performed shortly after delivery, approximately 24 hours ago, under MAC anesthesia.  Her pain is well controlled.    She denies headache or visual disturbances or other symptoms of pregnancy-induced hypertension.    Objective     Vital Signs Range for the last 24 hours  Temperature: Temp:  [97.4 °F (36.3 °C)-98.6 °F (37 °C)] 98.5 °F (36.9 °C)   Temp Source: Temp src: Oral   BP: BP:  "(123-171)/() 156/98   Pulse: Heart Rate:  [] 84   Respirations: Resp:  [14-16] 16   SPO2: SpO2:  [95 %-100 %] 98 %   O2 Amount (l/min): Flow (L/min):  [3-8] 3   O2 Devices Device (Oxygen Therapy): nasal cannula   Weight:       Admit Height:  Height: 175.3 cm (69\")      Physical Exam:  General:  no acute distresss.  Abdomen: abdomen is soft without significant tenderness, masses, organomegaly or guarding. Fundus: appropriate, firm, non tender  Extremities: normal, atraumatic, no cyanosis, and trace edema.   Vulva is appropriate.  Right labia is swollen but consistent with what would be expected at this point.  There is some bruising.  Palpation does not reveal any significant fluid collection.    Lab results reviewed:  Yes   Rubella:  No results found for: RUBELLAIGGIN Nurse Transcribed from prenatal record --  No components found for: EXTRUBELQUAL  Rh Status:  No results found for: RH  Immunizations:   Immunization History   Administered Date(s) Administered   • Flu Vaccine Intradermal Quad 18-64YR 10/05/2020   • Tdap 02/19/2021     Lab Results (last 24 hours)     ** No results found for the last 24 hours. **          Assessment/Plan       Severe preeclampsia      Sofia Caruso is Day 1  post-partum  Vaginal, Spontaneous  none .      Plan:  Continue current care.  Follow-up on H&H this morning.  Discontinue magnesium and Connell catheter.  Patient may have difficulty voiding due to the issues following delivery as well as the prolonged catheter.  She understands that in and out catheter may be needed but that a trial of spontaneous voiding is indicated at this time.  We will continue to watch blood pressures and treat as indicated.  Plan reviewed with the patient.  We will go to the postpartum floor later on this morning.      Derrick Salcido MD  3/9/2021  04:32 CST      Electronically signed by Derrick Salcido MD at 03/09/21 0435       "

## 2021-03-11 NOTE — PROGRESS NOTES
"      Sharif Sams MD  [facility]  [address]  [place] [address]  Office [phone]  Fax [phone]    [facility]  Vaginal Delivery Progress Note    Subjective   Postpartum Day 3: Vaginal Delivery    The patient feels well.  Her pain is well controlled with nonsteroidal anti-inflammatory drugs.   She is ambulating and voiding well.  Patient describes her bleeding as thin lochia. She states that her swelling is stable and sore. She is having some occasional nausea.    Breastfeeding: infant latching without difficulty.    Objective     Vital Signs Range for the last 24 hours  Temperature: Temp:  [97.5 °F (36.4 °C)-98.5 °F (36.9 °C)] 98.5 °F (36.9 °C)   Temp Source: Temp src: Temporal   BP: BP: (135-156)/(65-99) 143/99   Pulse: Heart Rate:  [77-98] 77   Respirations: Resp:  [18] 18   SPO2: SpO2:  [97 %-99 %] 99 %   O2 Amount (l/min):     O2 Devices Device (Oxygen Therapy): room air   Weight:       Admit Height:  Height: 175.3 cm (69\")      Physical Exam:  General:  no acute distresss.  Abdomen: Fundus: appropriate, firm, non tender  Extremities: normal, atraumatic, no cyanosis, and trace edema.   Vulvar bruising and swelling stable      Lab results reviewed:  Yes   Rubella:  No results found for: RUBELLAIGGIN Nurse Transcribed from prenatal record --  No components found for: EXTRUBELQUAL  Rh Status:  No results found for: RH  Immunizations:   Immunization History   Administered Date(s) Administered   • Flu Vaccine Intradermal Quad 18-64YR 10/05/2020   • Tdap 02/19/2021     Lab Results (last 24 hours)     ** No results found for the last 24 hours. **          Assessment/Plan       Severe preeclampsia      Sofia Caruso is Day 3  post-partum  Vaginal, Spontaneous  none .      Plan: BP better over the last 24 hours, she is able to monitor at home. Discharge home with standard precautions and return to clinic in 1 week.      Sharif Sams MD  3/11/2021  07:49 CST      "

## 2021-03-11 NOTE — PLAN OF CARE
Goal Outcome Evaluation:  Plan of Care Reviewed With: patient, spouse  Progress: improving  Outcome Summary: ff ml u2 scant lochia, labia bruised/edema r/t hematoma that was evacuated, BP ranged from 140s-150s/80s-90s, ambulating, voiding, has decided to only formula feed

## 2021-03-12 DIAGNOSIS — O13.3 GESTATIONAL HYPERTENSION, THIRD TRIMESTER: Primary | ICD-10-CM

## 2021-03-12 RX ORDER — NIFEDIPINE 60 MG/1
60 TABLET, EXTENDED RELEASE ORAL DAILY
Qty: 30 TABLET | Refills: 0 | Status: SHIPPED | OUTPATIENT
Start: 2021-03-12 | End: 2021-04-19

## 2021-03-12 NOTE — TELEPHONE ENCOUNTER
Spoke to Dr. Davis as she is on call, she informed me she needs Procardia XL 60mg QD and to monitor BP if necessary but should improve, pt voiced understanding.

## 2021-03-12 NOTE — PAYOR COMM NOTE
"REF:[id number]    [facility]  MAJO  [phone]  OR  FAX   [phone]     Sofia Lopez (30 y.o. Female)     Date of Birth Social Security Number Address Home Phone MRN    1990  [address] [place] [address] [phone] [id number]    [employer] Marital Status          None        Admission Date Admission Type Admitting Provider Attending Provider Department, Room/Bed    3/5/21 Elective Derrick Salcido MD  [facility] MOTHER BABY 2A, M238/1    Discharge Date Discharge Disposition Discharge Destination        3/11/2021 Home or Self Care              Attending Provider: (none)   Allergies: No Known Allergies    Isolation: None   Infection: None   Code Status: Prior    Ht: 175.3 cm (69\")   Wt: 109 kg (240 lb)    Admission Cmt: None   Principal Problem: Severe preeclampsia [O14.10]                 Active Insurance as of 3/5/2021     Primary Coverage     Payor Plan Insurance Group Employer/Plan Group    [employer] PPO [id number]     Payor Plan Address Payor Plan Phone Number Payor Plan Fax Number Effective Dates    [address] [phone]  2019 - None Entered    [facility] [id number]       Subscriber Name Subscriber Birth Date Member ID       EMERITA LOPEZ 1987 [id number]                 Emergency Contacts      (Rel.) Home Phone Work Phone Mobile Phone    Geoff Lopez (Spouse) -- -- [phone]               Discharge Summary      Sharif Sams MD at 21 0754          [facility]    Sharif Sams MD  [address]  [place] [address]  [phone]      Discharge Summary    [facility]  [facility]of[facility]  : 1990  MRN: [id number]  CSN: [id number]    Date of Admission: 3/5/2021   Date of Discharge:  3/11/2021   Delivering Physician: Sharif Sams        Admission Diagnosis: 1. Severe preeclampsia [O14.10]   Discharge Diagnosis: 1. Pregnancy " at 35w4d - delivered       Procedures: 3/8/2021  - Vaginal, Spontaneous       Hospital Course  Patient is a 30 y.o.  who at 35w4d had a vaginal birth.  Her postpartum course was complicated by a large right labial hematoma which required evacuation and repair in the operating room.  On PPD #3 she was ready for discharge.  Her blood pressure was stable and so was the labial swelling. she had normal lochia and pain well controlled with oral medications.    Infant  male  fetus weighing 2390 g (5 lb 4.3 oz)   Apgars -  8 @ 1 minute /  8 @ 5 minutes.    Discharge labs  Lab Results   Component Value Date    WBC 14.30 (H) 2021    HGB 8.2 (L) 2021    HCT 24.5 (L) 2021     2021       Discharge Medications     Discharge Medications      New Medications      Instructions Start Date   ALPRAZolam 0.5 MG tablet  Commonly known as: XANAX   0.5 mg, Oral, 2 Times Daily PRN      ondansetron ODT 4 MG disintegrating tablet  Commonly known as: Zofran ODT   4 mg, Translingual, Every 8 Hours PRN      sertraline 25 MG tablet  Commonly known as: ZOLOFT   25 mg, Oral, Daily         Continue These Medications      Instructions Start Date   prenatal (CLASSIC) vitamin  tablet  Generic drug: prenatal vitamin   Oral, Daily             Discharge Disposition Home or Self Care   Condition on Discharge: good   Follow-up: 1 week with Loly Sams MD  3/11/2021        Electronically signed by Sharif Sams MD at 21 [phone]

## 2021-03-17 ENCOUNTER — POSTPARTUM VISIT (OUTPATIENT)
Dept: OBSTETRICS AND GYNECOLOGY | Facility: CLINIC | Age: 31
End: 2021-03-17

## 2021-03-17 VITALS
DIASTOLIC BLOOD PRESSURE: 84 MMHG | SYSTOLIC BLOOD PRESSURE: 148 MMHG | HEIGHT: 69 IN | WEIGHT: 201 LBS | BODY MASS INDEX: 29.77 KG/M2

## 2021-03-17 DIAGNOSIS — Z78.9 NON-SMOKER: ICD-10-CM

## 2021-03-17 PROCEDURE — 0503F POSTPARTUM CARE VISIT: CPT | Performed by: OBSTETRICS & GYNECOLOGY

## 2021-03-17 NOTE — PROGRESS NOTES
"Chief Complaint  Postpartum Care (pt here for postpartum appt, had vaginal delivery 03/08/2021, had baby boy, Lucio, not breastfeeding, denies any problems)    Subjective          Sofia Caruso presents to [facility]  History of Present Illness  The patient presents status post vaginal delivery on 03/08/2021 at 35 weeks complicated by hypertension. Postpartum, she developed a labial hematoma several hours after delivery which was evacuated. She is bottle feeding the baby. She relates having palpitations, but feels it was due to her blood pressure as this has improved. The patient was prescribed Procardia 60 mg on Friday, 03/12/2021.    The patient has felt weak and had blood drawn at work which revealed hemoglobin up to 9. She states she is better every day.     Objective   Vital Signs:   /84 (BP Location: Left arm, Patient Position: Sitting, Cuff Size: Adult)   Ht 175.3 cm (69\")   Wt 91.2 kg (201 lb)   BMI 29.68 kg/m²     Physical Exam  Genitourinary:     Comments: Right labia shows no evidence of infection. The wound is healing well.        Result Review :   The following data was reviewed by: Derrick Salcido MD on 03/17/2021:  Common labs    Common Labsle 3/5/21 3/5/21 3/5/21 3/8/21 3/8/21 3/9/21    1008 1008 1008 0625 0625    Glucose  83   103 (A)    BUN  11   7    Creatinine  0.68   0.99    eGFR Non African Am  103   67    Sodium  137   134 (A)    Potassium  4.3   4.2    Chloride  105   102    Calcium  9.0   7.3 (A)    Albumin  3.40 (A)       Total Bilirubin  0.2       Alkaline Phosphatase  126 (A)       AST (SGOT)  16       ALT (SGPT)  11       WBC 8.35   14.30 (A)     Hemoglobin 12.4   9.5 (A)  8.2 (A)   Hematocrit 35.4   28.6 (A)  24.5 (A)   Platelets 284   227     Uric Acid   5.8 (A)      (A) Abnormal value       Comments are available for some flowsheets but are not being displayed.           Data reviewed: Recent hospitalization notes delivery and operative summaries "          Assessment and Plan    Diagnoses and all orders for this visit:    1. Postpartum examination following vaginal delivery (Primary)    2. Non-smoker      We discussed that pregnancy related hypertension can last up to 6 weeks postpartum. I have instructed her to continue taking Procardia at this time. We will recheck her blood pressure in 6 weeks. If she continues to have high blood pressure, while taking the medication, we will have her follow up with her primary care provider.       Follow Up   Return in about 1 month (around 4/17/2021).  Patient was given instructions and counseling regarding her condition or for health maintenance advice. Please see specific information pulled into the AVS if appropriate.     Scribed for Derrick Salcido MD by ZHANG BILLY  03/17/21   11:48 CDT    I have personally performed the services described in this document as scribed by the above individual, and it is both accurate and complete.  Derrick Salcido MD  3/17/2021  12:21 CDT

## 2021-04-19 ENCOUNTER — POSTPARTUM VISIT (OUTPATIENT)
Dept: OBSTETRICS AND GYNECOLOGY | Facility: CLINIC | Age: 31
End: 2021-04-19

## 2021-04-19 VITALS
HEIGHT: 69 IN | DIASTOLIC BLOOD PRESSURE: 78 MMHG | SYSTOLIC BLOOD PRESSURE: 124 MMHG | WEIGHT: 212 LBS | BODY MASS INDEX: 31.4 KG/M2

## 2021-04-19 DIAGNOSIS — Z30.011 ENCOUNTER FOR INITIAL PRESCRIPTION OF CONTRACEPTIVE PILLS: ICD-10-CM

## 2021-04-19 DIAGNOSIS — Z78.9 NON-SMOKER: ICD-10-CM

## 2021-04-19 PROBLEM — Z34.90 PREGNANCY: Status: RESOLVED | Noted: 2021-02-24 | Resolved: 2021-04-19

## 2021-04-19 PROBLEM — O14.10 SEVERE PREECLAMPSIA: Status: RESOLVED | Noted: 2021-03-05 | Resolved: 2021-04-19

## 2021-04-19 PROCEDURE — 0503F POSTPARTUM CARE VISIT: CPT | Performed by: OBSTETRICS & GYNECOLOGY

## 2021-04-19 RX ORDER — ALPRAZOLAM 0.5 MG/1
0.5 TABLET ORAL 2 TIMES DAILY PRN
COMMUNITY
Start: 2021-03-30

## 2021-04-19 RX ORDER — NORETHINDRONE ACETATE AND ETHINYL ESTRADIOL, ETHINYL ESTRADIOL AND FERROUS FUMARATE 1MG-10(24)
1 KIT ORAL DAILY
Qty: 28 TABLET | Refills: 3 | Status: SHIPPED | OUTPATIENT
Start: 2021-04-19 | End: 2021-05-17

## 2021-04-19 NOTE — PROGRESS NOTES
"Chief Complaint  Postpartum Care (pt here for postpartum appt, had vaginal delivery 03/08/2021, had baby boy, Lucio, not breastfeeding, denies any problems)    Subjective          Sofia Caruso presents to [facility]  History of Present Illness     The patient presents today for her 6-week postpartum examination. She is bottle feeding her baby. The patient reports of some pain. She reports she is no longer taking her medication and her blood pressures have been normal.    Objective   Vital Signs:   /78 (BP Location: Right arm, Patient Position: Sitting, Cuff Size: Large Adult)   Ht 175.3 cm (69\")   Wt 96.2 kg (212 lb)   BMI 31.31 kg/m²     Physical Exam  Vitals and nursing note reviewed. Exam conducted with a chaperone present.   Constitutional:       Appearance: Normal appearance.   HENT:      Head: Normocephalic and atraumatic.   Abdominal:      General: Abdomen is flat. Bowel sounds are normal.      Palpations: Abdomen is soft.   Genitourinary:     Comments: Pelvic: Right labia majora slightly larger than the left. No evidence of infection. No evidence of induration. No evidence of fluid collection. Incision has reapproximated well, no evidence of infection at incision.   Musculoskeletal:         General: Normal range of motion.      Cervical back: Normal range of motion and neck supple.   Skin:     General: Skin is warm and dry.   Neurological:      General: No focal deficit present.      Mental Status: She is alert and oriented to person, place, and time. Mental status is at baseline.   Psychiatric:         Mood and Affect: Mood normal.         Behavior: Behavior normal.         Thought Content: Thought content normal.         Judgment: Judgment normal.        Result Review :   The following data was reviewed by: Derrick Salcido MD on 04/19/2021:  Common labs    Common Labsle 3/5/21 3/5/21 3/5/21 3/8/21 3/8/21 3/9/21    1008 1008 1008 0625 0625    Glucose  83   103 (A)  "   BUN  11   7    Creatinine  0.68   0.99    eGFR Non African Am  103   67    Sodium  137   134 (A)    Potassium  4.3   4.2    Chloride  105   102    Calcium  9.0   7.3 (A)    Albumin  3.40 (A)       Total Bilirubin  0.2       Alkaline Phosphatase  126 (A)       AST (SGOT)  16       ALT (SGPT)  11       WBC 8.35   14.30 (A)     Hemoglobin 12.4   9.5 (A)  8.2 (A)   Hematocrit 35.4   28.6 (A)  24.5 (A)   Platelets 284   227     Uric Acid   5.8 (A)      (A) Abnormal value       Comments are available for some flowsheets but are not being displayed.                  L&D Delivery Note by Sharif Sams MD (2021 02:18)  Op Note by Sharif Sams MD (2021 05:14)  Discharge Summary by Sharif Sams MD (2021 07:54)         Assessment and Plan    Diagnoses and all orders for this visit:    1. Postpartum examination following vaginal delivery (Primary)    2. Encounter for initial prescription of contraceptive pills    3. Non-smoker    Other orders  -     Norethin-Eth Estrad-Fe Biphas (Lo Loestrin Fe) 1 MG-10 MCG / 10 MCG tablet; Take 1 tablet by mouth Daily for 28 days.  Dispense: 28 tablet; Refill: 3      The patient was advised that the complications with this delivery is in no way a reason to not become pregnant again the future. How a future delivery would occur, vaginal versus a  section, would be discussed during a future pregnancy.    The patient would like to proceed with oral contraceptives.    When initially starting birth control pills, the first pilll should be taken on the  following the onset of your next cycle.  For maximum effectiveness, it should be taken the same time each day.  Because it may take 1 month to become effective, you should use of alternative contraception for the first month.  There is the potential for breakthrough bleeding to occur for up to 4 cycles.     Should you fail to take your birth control on time:    · If you miss a single pill, take  it immediately that day.  · If you an entire day, take the pill you missed in addition to the pill you are scheduled to take.  · If you miss 2 consecutive pills, take the extra pill each of the next 2 days.  · If you ever miss 3 consecutive pills, discard the remainder of the pack and start a new pack of pills the Sunday after the next menses begins.  · Should you have any questions about how to manage this, call the office during office hours and we will review this with you          Follow Up   Return in about 3 months (around 7/19/2021) for Annual physical with me.  Patient was given instructions and counseling regarding her condition or for health maintenance advice. Please see specific information pulled into the AVS if appropriate.     Scribed for Derrick Salcido MD by Rosa Gentile.  04/19/21   11:23 CDT    I have personally performed the services described in this document as scribed by the above individual, and it is both accurate and complete.  Derrick Salcido MD  4/19/2021  11:31 CDT

## 2022-04-12 NOTE — PROGRESS NOTES
Sharif Sams MD  [facility]  [address]  [place] [address]  Office [phone]  Fax [phone]      [facility]  Sofia Caruso  : 1990  MRN: [id number]  CSN: [id number]    Labor progress note    Subjective   She reports is feeling mildly painful contraction, no headache     Objective   Min/max vitals past 24 hours:  Temp  Min: 98 °F (36.7 °C)  Max: 98.4 °F (36.9 °C)   BP  Min: 115/57  Max: 159/106   Pulse  Min: 61  Max: 92   Resp  Min: 18  Max: 18        FHT's: reactive and category 1.  external monitors used   Cervix: was checked (by RN): 1 cm / 50 % / -3   Contractions: irregular   Pitocin at 10      Assessment   1. IUP at 35w3d  2. Severe pre-eclampsia     Plan   1.   Continue with augmentation  Magnesium and PCN once making cervical change  Allow labor to continue pending maternal and fetal status  Plan discussed with family and questions answered.  Understanding verbalized.    Sharif Sams MD  3/7/2021  08:20 CST             Solaraze Counseling:  I discussed with the patient the risks of Solaraze including but not limited to erythema, scaling, itching, weeping, crusting, and pain.

## (undated) DEVICE — GLV SURG BIOGEL M LTX PF 8

## (undated) DEVICE — ELECTRD NDL EZ CLN STD 2.75IN

## (undated) DEVICE — ANTIBACTERIAL UNDYED BRAIDED (POLYGLACTIN 910), SYNTHETIC ABSORBABLE SUTURE: Brand: COATED VICRYL

## (undated) DEVICE — PAD MAJOR LITHOTOMY: Brand: MEDLINE INDUSTRIES, INC.

## (undated) DEVICE — PK TURNOVER RM ADV